# Patient Record
Sex: FEMALE | Race: WHITE | NOT HISPANIC OR LATINO | Employment: OTHER | URBAN - METROPOLITAN AREA
[De-identification: names, ages, dates, MRNs, and addresses within clinical notes are randomized per-mention and may not be internally consistent; named-entity substitution may affect disease eponyms.]

---

## 2023-03-15 LAB
ALANINE AMINOTRANSFERASE (SGPT) (U/L) IN SER/PLAS: 44 U/L (ref 7–45)
CHOLESTEROL (MG/DL) IN SER/PLAS: 152 MG/DL (ref 0–199)
CHOLESTEROL IN HDL (MG/DL) IN SER/PLAS: 74.4 MG/DL
CHOLESTEROL/HDL RATIO: 2
LDL: 60 MG/DL (ref 0–99)
TRIGLYCERIDE (MG/DL) IN SER/PLAS: 86 MG/DL (ref 0–149)
VLDL: 17 MG/DL (ref 0–40)

## 2023-05-10 LAB
ANION GAP IN SER/PLAS: 12 MMOL/L (ref 10–20)
CALCIUM (MG/DL) IN SER/PLAS: 10.1 MG/DL (ref 8.6–10.6)
CARBON DIOXIDE, TOTAL (MMOL/L) IN SER/PLAS: 30 MMOL/L (ref 21–32)
CHLORIDE (MMOL/L) IN SER/PLAS: 102 MMOL/L (ref 98–107)
CHOLESTEROL (MG/DL) IN SER/PLAS: 151 MG/DL (ref 0–199)
CHOLESTEROL IN HDL (MG/DL) IN SER/PLAS: 81.4 MG/DL
CHOLESTEROL/HDL RATIO: 1.9
CREATININE (MG/DL) IN SER/PLAS: 0.59 MG/DL (ref 0.5–1.05)
GFR FEMALE: >90 ML/MIN/1.73M2
GLUCOSE (MG/DL) IN SER/PLAS: 95 MG/DL (ref 74–99)
LDL: 55 MG/DL (ref 0–99)
POTASSIUM (MMOL/L) IN SER/PLAS: 3.9 MMOL/L (ref 3.5–5.3)
SODIUM (MMOL/L) IN SER/PLAS: 140 MMOL/L (ref 136–145)
TRIGLYCERIDE (MG/DL) IN SER/PLAS: 73 MG/DL (ref 0–149)
UREA NITROGEN (MG/DL) IN SER/PLAS: 18 MG/DL (ref 6–23)
VLDL: 15 MG/DL (ref 0–40)

## 2023-11-01 ENCOUNTER — PATIENT MESSAGE (OUTPATIENT)
Dept: PRIMARY CARE | Facility: CLINIC | Age: 68
End: 2023-11-01
Payer: MEDICARE

## 2023-11-01 DIAGNOSIS — E55.9 VITAMIN D DEFICIENCY: ICD-10-CM

## 2023-11-01 DIAGNOSIS — E78.5 HYPERLIPIDEMIA, UNSPECIFIED HYPERLIPIDEMIA TYPE: ICD-10-CM

## 2023-11-01 DIAGNOSIS — I10 HYPERTENSION, UNSPECIFIED TYPE: Primary | ICD-10-CM

## 2023-11-07 ENCOUNTER — LAB (OUTPATIENT)
Dept: LAB | Facility: LAB | Age: 68
End: 2023-11-07
Payer: MEDICARE

## 2023-11-07 DIAGNOSIS — E55.9 VITAMIN D DEFICIENCY: ICD-10-CM

## 2023-11-07 DIAGNOSIS — E78.5 HYPERLIPIDEMIA, UNSPECIFIED HYPERLIPIDEMIA TYPE: ICD-10-CM

## 2023-11-07 DIAGNOSIS — I10 HYPERTENSION, UNSPECIFIED TYPE: ICD-10-CM

## 2023-11-07 LAB
25(OH)D3 SERPL-MCNC: 66 NG/ML (ref 30–100)
ALBUMIN SERPL BCP-MCNC: 4.5 G/DL (ref 3.4–5)
ALP SERPL-CCNC: 60 U/L (ref 33–136)
ALT SERPL W P-5'-P-CCNC: 29 U/L (ref 7–45)
ANION GAP SERPL CALC-SCNC: 14 MMOL/L (ref 10–20)
AST SERPL W P-5'-P-CCNC: 21 U/L (ref 9–39)
BILIRUB SERPL-MCNC: 0.6 MG/DL (ref 0–1.2)
BUN SERPL-MCNC: 21 MG/DL (ref 6–23)
CALCIUM SERPL-MCNC: 9.7 MG/DL (ref 8.6–10.6)
CHLORIDE SERPL-SCNC: 102 MMOL/L (ref 98–107)
CO2 SERPL-SCNC: 28 MMOL/L (ref 21–32)
CREAT SERPL-MCNC: 0.6 MG/DL (ref 0.5–1.05)
GFR SERPL CREATININE-BSD FRML MDRD: >90 ML/MIN/1.73M*2
GLUCOSE SERPL-MCNC: 96 MG/DL (ref 74–99)
LDLC SERPL DIRECT ASSAY-MCNC: 50 MG/DL (ref 0–129)
POTASSIUM SERPL-SCNC: 3.6 MMOL/L (ref 3.5–5.3)
PROT SERPL-MCNC: 7 G/DL (ref 6.4–8.2)
SODIUM SERPL-SCNC: 140 MMOL/L (ref 136–145)
TSH SERPL-ACNC: 2.99 MIU/L (ref 0.44–3.98)

## 2023-11-07 PROCEDURE — 36415 COLL VENOUS BLD VENIPUNCTURE: CPT

## 2023-11-07 PROCEDURE — 83721 ASSAY OF BLOOD LIPOPROTEIN: CPT

## 2023-11-07 PROCEDURE — 82306 VITAMIN D 25 HYDROXY: CPT

## 2023-11-07 PROCEDURE — 80053 COMPREHEN METABOLIC PANEL: CPT

## 2023-11-07 PROCEDURE — 84443 ASSAY THYROID STIM HORMONE: CPT

## 2023-11-10 PROBLEM — M41.9 SCOLIOSIS: Status: ACTIVE | Noted: 2023-11-10

## 2023-11-10 PROBLEM — R79.89 ABNORMAL THYROID BLOOD TEST: Status: ACTIVE | Noted: 2023-11-10

## 2023-11-10 PROBLEM — M47.816 LUMBAR SPONDYLOSIS: Status: ACTIVE | Noted: 2023-11-10

## 2023-11-10 PROBLEM — R92.30 DENSE BREAST TISSUE: Status: ACTIVE | Noted: 2023-11-10

## 2023-11-10 PROBLEM — C50.919 ADENOCARCINOMA OF BREAST (MULTI): Status: ACTIVE | Noted: 2023-11-10

## 2023-11-10 PROBLEM — I25.10 CORONARY ARTERIOSCLEROSIS: Status: ACTIVE | Noted: 2023-11-10

## 2023-11-10 PROBLEM — D18.03 LIVER HEMANGIOMA: Status: ACTIVE | Noted: 2023-11-10

## 2023-11-10 PROBLEM — D72.819 DECREASED WHITE BLOOD CELL COUNT, UNSPECIFIED: Status: ACTIVE | Noted: 2023-11-10

## 2023-11-10 PROBLEM — I10 HYPERTENSION: Status: ACTIVE | Noted: 2023-11-10

## 2023-11-10 PROBLEM — E78.00 ELEVATED LDL CHOLESTEROL LEVEL: Status: ACTIVE | Noted: 2023-11-10

## 2023-11-10 PROBLEM — R93.89 ABNORMAL FINDING ON CT SCAN: Status: ACTIVE | Noted: 2023-11-10

## 2023-11-10 PROBLEM — R00.2 PALPITATIONS: Status: ACTIVE | Noted: 2023-11-10

## 2023-11-10 PROBLEM — R82.90 ABNORMAL URINE FINDINGS: Status: ACTIVE | Noted: 2023-11-10

## 2023-11-10 PROBLEM — R22.31 NODULE OF SKIN OF RIGHT HAND: Status: ACTIVE | Noted: 2023-11-10

## 2023-11-10 PROBLEM — M85.80 OSTEOPENIA: Status: ACTIVE | Noted: 2023-11-10

## 2023-11-10 PROBLEM — K62.5 RECTAL/ANAL HEMORRHAGE: Status: ACTIVE | Noted: 2023-11-10

## 2023-11-10 PROBLEM — S52.501A DISTAL RADIUS FRACTURE, RIGHT: Status: ACTIVE | Noted: 2023-11-10

## 2023-11-10 PROBLEM — G47.9 SLEEP DISORDER: Status: ACTIVE | Noted: 2023-11-10

## 2023-11-10 RX ORDER — ATORVASTATIN CALCIUM 80 MG/1
80 TABLET, FILM COATED ORAL NIGHTLY
COMMUNITY
Start: 2023-04-21 | End: 2023-11-13 | Stop reason: SDUPTHER

## 2023-11-10 RX ORDER — METOPROLOL TARTRATE 25 MG/1
25 TABLET, FILM COATED ORAL 2 TIMES DAILY
COMMUNITY
Start: 2023-08-31 | End: 2024-05-28

## 2023-11-10 RX ORDER — TRAMADOL HYDROCHLORIDE 50 MG/1
50 TABLET ORAL
COMMUNITY
Start: 2021-07-02 | End: 2023-11-13 | Stop reason: ALTCHOICE

## 2023-11-10 RX ORDER — CALCIUM CARBONATE/VITAMIN D3 600MG-5MCG
1 TABLET ORAL DAILY
COMMUNITY

## 2023-11-10 RX ORDER — ATORVASTATIN CALCIUM 40 MG/1
TABLET, FILM COATED ORAL
COMMUNITY
Start: 2023-10-08 | End: 2024-05-31 | Stop reason: SDUPTHER

## 2023-11-10 RX ORDER — AMLODIPINE BESYLATE 2.5 MG/1
1 TABLET ORAL DAILY
COMMUNITY
Start: 2020-08-03 | End: 2023-11-13 | Stop reason: SDUPTHER

## 2023-11-10 RX ORDER — OMEPRAZOLE 40 MG/1
40 CAPSULE, DELAYED RELEASE ORAL DAILY
COMMUNITY
Start: 2023-04-07 | End: 2023-11-13 | Stop reason: ALTCHOICE

## 2023-11-10 RX ORDER — METOPROLOL TARTRATE 50 MG/1
50 TABLET ORAL 2 TIMES DAILY
COMMUNITY
Start: 2023-02-13 | End: 2023-11-13 | Stop reason: SDUPTHER

## 2023-11-10 RX ORDER — ASPIRIN 81 MG/1
81 TABLET ORAL DAILY
COMMUNITY

## 2023-11-10 RX ORDER — HYDROCHLOROTHIAZIDE 25 MG/1
25 TABLET ORAL DAILY
COMMUNITY
End: 2024-03-13

## 2023-11-10 RX ORDER — CHOLECALCIFEROL (VITAMIN D3) 50 MCG
2 TABLET ORAL DAILY
COMMUNITY

## 2023-11-10 RX ORDER — ACETAMINOPHEN 325 MG/1
650 TABLET ORAL AS NEEDED
COMMUNITY

## 2023-11-10 RX ORDER — AMLODIPINE BESYLATE 5 MG/1
1 TABLET ORAL DAILY
COMMUNITY
End: 2024-05-09

## 2023-11-13 ENCOUNTER — OFFICE VISIT (OUTPATIENT)
Dept: PRIMARY CARE | Facility: CLINIC | Age: 68
End: 2023-11-13
Payer: MEDICARE

## 2023-11-13 VITALS
WEIGHT: 105.6 LBS | DIASTOLIC BLOOD PRESSURE: 74 MMHG | SYSTOLIC BLOOD PRESSURE: 119 MMHG | HEART RATE: 63 BPM | HEIGHT: 57 IN | BODY MASS INDEX: 22.78 KG/M2

## 2023-11-13 DIAGNOSIS — Z00.00 ROUTINE GENERAL MEDICAL EXAMINATION AT HEALTH CARE FACILITY: Primary | ICD-10-CM

## 2023-11-13 DIAGNOSIS — Z78.0 ASYMPTOMATIC MENOPAUSAL STATE: ICD-10-CM

## 2023-11-13 DIAGNOSIS — M72.2 PLANTAR FASCIITIS: ICD-10-CM

## 2023-11-13 DIAGNOSIS — I10 HYPERTENSION, UNSPECIFIED TYPE: ICD-10-CM

## 2023-11-13 DIAGNOSIS — Z00.00 MEDICARE ANNUAL WELLNESS VISIT, SUBSEQUENT: ICD-10-CM

## 2023-11-13 DIAGNOSIS — E78.5 HYPERLIPIDEMIA, UNSPECIFIED HYPERLIPIDEMIA TYPE: ICD-10-CM

## 2023-11-13 PROCEDURE — 99214 OFFICE O/P EST MOD 30 MIN: CPT | Performed by: INTERNAL MEDICINE

## 2023-11-13 PROCEDURE — 1036F TOBACCO NON-USER: CPT | Performed by: INTERNAL MEDICINE

## 2023-11-13 PROCEDURE — 1170F FXNL STATUS ASSESSED: CPT | Performed by: INTERNAL MEDICINE

## 2023-11-13 PROCEDURE — 1160F RVW MEDS BY RX/DR IN RCRD: CPT | Performed by: INTERNAL MEDICINE

## 2023-11-13 PROCEDURE — 1159F MED LIST DOCD IN RCRD: CPT | Performed by: INTERNAL MEDICINE

## 2023-11-13 PROCEDURE — 3078F DIAST BP <80 MM HG: CPT | Performed by: INTERNAL MEDICINE

## 2023-11-13 PROCEDURE — G0439 PPPS, SUBSEQ VISIT: HCPCS | Performed by: INTERNAL MEDICINE

## 2023-11-13 PROCEDURE — 1126F AMNT PAIN NOTED NONE PRSNT: CPT | Performed by: INTERNAL MEDICINE

## 2023-11-13 PROCEDURE — 3074F SYST BP LT 130 MM HG: CPT | Performed by: INTERNAL MEDICINE

## 2023-11-13 ASSESSMENT — PATIENT HEALTH QUESTIONNAIRE - PHQ9
SUM OF ALL RESPONSES TO PHQ9 QUESTIONS 1 AND 2: 0
2. FEELING DOWN, DEPRESSED OR HOPELESS: NOT AT ALL
1. LITTLE INTEREST OR PLEASURE IN DOING THINGS: NOT AT ALL
1. LITTLE INTEREST OR PLEASURE IN DOING THINGS: NOT AT ALL
2. FEELING DOWN, DEPRESSED OR HOPELESS: NOT AT ALL
SUM OF ALL RESPONSES TO PHQ9 QUESTIONS 1 AND 2: 0

## 2023-11-13 ASSESSMENT — ACTIVITIES OF DAILY LIVING (ADL)
TAKING_MEDICATION: INDEPENDENT
DRESSING: INDEPENDENT
DRESSING: INDEPENDENT
BATHING: INDEPENDENT
BATHING: INDEPENDENT
DOING_HOUSEWORK: INDEPENDENT
MANAGING_FINANCES: INDEPENDENT
GROCERY_SHOPPING: INDEPENDENT

## 2023-11-13 ASSESSMENT — ENCOUNTER SYMPTOMS
LOSS OF SENSATION IN FEET: 0
OCCASIONAL FEELINGS OF UNSTEADINESS: 0
DEPRESSION: 0

## 2023-11-13 NOTE — PROGRESS NOTES
Subjective   Patient ID: Anna Smith is a 68 y.o. female who presents for Medicare Annual Wellness Visit Subsequent.    HPI  Patient comes in for a physical exam last one done over a year ago , doing well over-all with no particular complaints. Also is in for laboratory review and health maintenance update.  Updating family history as well.  Interval event - past medical history, surgical, social, and family history reviewed and updated.  Interval care -  Patient is    up to date with dental care.  Patient does     receive routine vision care.    Review of Systems  General: Denies fever, chills, night sweats, changes in appetite or weight  ENT: Negative for ear pain, hearing loss, headache, difficulty swallowing, up to date with dental checks   Eyes: Negative for recent visual changes, up to date with eye exams  Dermatologic: Negative for new skin conditions, rash  Respiratory: Negative for paroxysmal nocturnal dyspnea, wheezing,shortness of breath, cough  Cardiovascular: Negative for chest pain, palpitations, or leg swelling  Gastrointestinal: Negative for nausea/vomiting, abdominal pain, changes in bowel habits  Genitourinary: Negative for dysuria, urgency, frequency  URINARY INCONTINENCE   Neurological: Negative for headaches, tremors, dizziness, memory loss, confusion, weakness, paresthesias  Psychiatric: Negative for sleep problems, anxiety, depression, conditions are stable  Endocrine: Negative for heat or cold intolerance, polyuria, polydipsia  Other:All systems have been reviewed and are negative except as previously noted.    Previous history  Past Medical History:   Diagnosis Date   • Asymptomatic menopausal state 04/15/2021    Asymptomatic menopausal state   • Encounter for general adult medical examination without abnormal findings 10/28/2022    Encounter for Medicare annual wellness exam   • Hemangioma of intra-abdominal structures 05/24/2017    Liver hemangioma   • Other chest pain 01/04/2023     Chest discomfort   • Other specified abnormalities of plasma proteins 2023    Elevated troponin   • Personal history of other benign neoplasm 2021    History of other benign neoplasm   • Sleep disorder, unspecified 2022    Sleep disorder   • Unspecified injury of unspecified ankle, initial encounter     Ankle injury     Past Surgical History:   Procedure Laterality Date   • BREAST LUMPECTOMY  2019    Breast Surgery Lumpectomy   • CT ANGIO CORONARY ART WITH HEARTFLOW IF SCORE >30%  2022    CT HEART CORONARY ANGIOGRAM 2022 DOCTOR OFFICE [facility]GAC[facility]     Social History     Tobacco Use   • Smoking status: Former     Types: Cigarettes     Quit date:      Years since quittin.8   • Smokeless tobacco: Never   Vaping Use   • Vaping Use: Never used   Substance Use Topics   • Alcohol use: Yes     Alcohol/week: 7.0 standard drinks of alcohol     Types: 7 Glasses of wine per week     Comment: 7 glasses/week   • Drug use: Never     Family History   Problem Relation Name Age of Onset   • Other (Parkinsons) Mother  70 - 79   • Other (Lewy Body) Mother     • Heart attack Father     • Coronary artery disease Sister     • Breast cancer Sister  45   • Coronary artery disease Brother     • Hyperlipidemia Brother     • Myasthenia gravis Brother     • Congenital heart disease Other Family Hx    • No Known Problems Sibling       No Known Allergies  Current Outpatient Medications   Medication Instructions   • acetaminophen (TYLENOL) 650 mg, oral, As needed   • amLODIPine (Norvasc) 2.5 mg tablet 1 tablet, oral, Daily   • amLODIPine (Norvasc) 5 mg tablet 1 tablet, oral, Daily   • aspirin 81 mg, oral, Daily   • atorvastatin (Lipitor) 40 mg tablet    • atorvastatin (LIPITOR) 80 mg, oral, Nightly   • calcium carbonate (CALCIUM 600 ORAL) 2 tablets, oral, Daily   • calcium carbonate-vitamin D3 600 mg-5 mcg (200 unit) tablet 1 tablet, oral, Daily   • cholecalciferol (Vitamin D-3) 50 MCG (2000 UT) tablet 2  tablets, oral, Daily   • ferrous sulfate (IRON ORAL) 1 tablet, oral, 3 times weekly   • ferrous sulfate (IRON ORAL) 1 tablet, oral, 4 times weekly   • hydroCHLOROthiazide (HYDRODIURIL) 25 mg, oral, Daily   • metoprolol tartrate (LOPRESSOR) 25 mg, oral, 2 times daily   • metoprolol tartrate (LOPRESSOR) 50 mg, oral, 2 times daily   • MULTIVITAMIN ORAL oral   • omeprazole (PRILOSEC) 40 mg, oral, Daily   • traMADol (ULTRAM) 50 mg, oral, Every 6-8 hours as needed for pain       Objective       Physical Exam  Vital Signs: as recorded above  General: Well groomed, well nourished   Orientation:  Alert , oriented to time, place , and person   Mood and Affect:  Cooperative , no apparent distress normal affect  Skin: Good color, good turgor  Eyes: Extra ocular muscle movements intact, anicteric sclerae  Neck: Supple, full range of movement  Chest: Normal breath sounds, normal chest wall exam, symmetric, good air entry, clear to auscultation  Heart: Regular rate and rhythm, without murmur, gallop, or rubs  Abdomen soft nontender no masses felt no hepatosplenomegaly, no rebound or guarding  BACK:  no CTLS spine tenderness, no flank tenderness  Extremities: full range of movement  bilateral UE and bilateral LE,  no lower extremity edema  Neurological: Alert, oriented, cranial nerves II-XII intact except for visual acuity  Sensation:  Intact   Gait: normal steady      Assessment/Plan   Anna Smith is a 68 y.o. female who presents for the concerns below:    Problem List Items Addressed This Visit    None    Annual Wellness Visit  the risks and benefits of influenza vaccination were discussed with the patient, influenza vaccine is up to date this year  the risks and benefits of Prevnar 20 (or previous Prevnar 13)   vaccination were discussed with the patient, Prevnar 20 vaccine is      up to date  the risks and benefits of pneumonia vaccination were discussed with the patient, pneumonia vaccine is  up to date   the risks and  benefits of  Shingrix  vaccination were discussed with the patient, Shingrix  vaccine is       up to date   the risks and benefits of tetanus vaccination were discussed with the patient, tetanus vaccine is      up to date   Cardiovascular screening and counseling the risk and benefits of screening were discussed counseling on maintaining a healthy diet and due for lipid panel  Breast cancer screening and counseling , the risks and benefits of screening were discussed with the patient, and screening is current     order is in  Cervical cancer screening and counseling , the risks and benefits of screening were discussed with the patient, and screening is current seeing gynecology  Osteoporosis screening and counseling was given on obtaining adequate amounts of calcium and vitamin D on a daily basis and weight bearing exercise such as walking  , the risks and benefits of screening were discussed with the patient, and screening is current    Colorectal cancer screening and counseling; the risks and benefits of screening were discussed with the patient, colon cancer screening is       up to date  Abdominal aortic aneurysm screening and counseling,  the risks and benefits of screening were discussed with the patient, screening is not indicated   Visual acuity / Glaucoma screening and counseling , the risks and benefits of vision screening were discussed with the patient, screening is current   HIV screening and Counseling, the risks and benefits of screening were discussed with the patient, screening is not indicated   Advance directive planning : needs to be updated information forms given to patient .  complete and up  to date    Other Preventive Counseling Provided :  fall risk reduction exercise , physical activity .  Patient Discussion:  plan discussed with the patient       HYPERTENSION PLAN:  , taking blood pressure medication  Follow low salt diet, exercise as discussed, weight control. Continue current  medications    HYPERCHOLESTEROLEMIA PLAN: stable  continue current medications  Follow low cholesterol diet, encouraged high omega 3 fatty acid intake in diet, exercise, weight control. . Will Obtain AST/ALT, fasting lipid profile, creatine kinase  on statin if not done within past 3-6 months . Coenzyme Q10 200 mg a day if able to help increase HDL.    Will get covid booster    Discussed with:   Return in :    Portions of this note were generated using digital voice recognition software, and may contain grammatical errors       Tram Connelly MD  11/13/23  9:44 AM

## 2024-03-13 DIAGNOSIS — I25.10 ATHEROSCLEROTIC HEART DISEASE OF NATIVE CORONARY ARTERY WITHOUT ANGINA PECTORIS: Primary | ICD-10-CM

## 2024-03-13 RX ORDER — HYDROCHLOROTHIAZIDE 25 MG/1
25 TABLET ORAL DAILY
Qty: 90 TABLET | Refills: 3 | Status: SHIPPED | OUTPATIENT
Start: 2024-03-13

## 2024-05-09 DIAGNOSIS — I10 HYPERTENSION, UNSPECIFIED TYPE: Primary | ICD-10-CM

## 2024-05-09 RX ORDER — AMLODIPINE BESYLATE 5 MG/1
5 TABLET ORAL DAILY
Qty: 90 TABLET | Refills: 2 | Status: SHIPPED | OUTPATIENT
Start: 2024-05-09

## 2024-05-22 ENCOUNTER — OFFICE VISIT (OUTPATIENT)
Dept: CARDIOLOGY | Facility: HOSPITAL | Age: 69
End: 2024-05-22
Payer: MEDICARE

## 2024-05-22 VITALS
SYSTOLIC BLOOD PRESSURE: 116 MMHG | BODY MASS INDEX: 22.04 KG/M2 | HEIGHT: 58 IN | HEART RATE: 53 BPM | DIASTOLIC BLOOD PRESSURE: 75 MMHG | WEIGHT: 105 LBS

## 2024-05-22 DIAGNOSIS — I25.10 CORONARY ARTERIOSCLEROSIS: Primary | ICD-10-CM

## 2024-05-22 DIAGNOSIS — I10 PRIMARY HYPERTENSION: ICD-10-CM

## 2024-05-22 DIAGNOSIS — E78.00 ELEVATED LDL CHOLESTEROL LEVEL: ICD-10-CM

## 2024-05-22 LAB
ATRIAL RATE: 53 BPM
P AXIS: 69 DEGREES
P OFFSET: 189 MS
P ONSET: 139 MS
PR INTERVAL: 164 MS
Q ONSET: 221 MS
QRS COUNT: 9 BEATS
QRS DURATION: 92 MS
QT INTERVAL: 446 MS
QTC CALCULATION(BAZETT): 418 MS
QTC FREDERICIA: 428 MS
R AXIS: 16 DEGREES
T AXIS: 49 DEGREES
T OFFSET: 444 MS
VENTRICULAR RATE: 53 BPM

## 2024-05-22 PROCEDURE — 93005 ELECTROCARDIOGRAM TRACING: CPT | Performed by: INTERNAL MEDICINE

## 2024-05-22 PROCEDURE — 99214 OFFICE O/P EST MOD 30 MIN: CPT | Performed by: INTERNAL MEDICINE

## 2024-05-22 PROCEDURE — 1157F ADVNC CARE PLAN IN RCRD: CPT | Performed by: INTERNAL MEDICINE

## 2024-05-22 PROCEDURE — G2211 COMPLEX E/M VISIT ADD ON: HCPCS | Performed by: INTERNAL MEDICINE

## 2024-05-22 PROCEDURE — 1036F TOBACCO NON-USER: CPT | Performed by: INTERNAL MEDICINE

## 2024-05-22 PROCEDURE — 3078F DIAST BP <80 MM HG: CPT | Performed by: INTERNAL MEDICINE

## 2024-05-22 PROCEDURE — 3074F SYST BP LT 130 MM HG: CPT | Performed by: INTERNAL MEDICINE

## 2024-05-22 PROCEDURE — 1159F MED LIST DOCD IN RCRD: CPT | Performed by: INTERNAL MEDICINE

## 2024-05-22 PROCEDURE — 1160F RVW MEDS BY RX/DR IN RCRD: CPT | Performed by: INTERNAL MEDICINE

## 2024-05-22 ASSESSMENT — ENCOUNTER SYMPTOMS
OCCASIONAL FEELINGS OF UNSTEADINESS: 0
DEPRESSION: 0
LOSS OF SENSATION IN FEET: 0

## 2024-05-22 NOTE — PROGRESS NOTES
Primary Care Physician: Tram Connelly MD  Date of Visit: 05/22/2024  3:00 PM EDT  Location of visit: [facility]     Chief Complaint:   Chief Complaint   Patient presents with    Follow-up        HPI / Summary:   Anna Smith is a 68 y.o. female presents for followup.  She has no cardiac complaints.  She is physically active and hikes 5 miles 5 days a week without chest pain or shortness of breath.  The patient denies chest pain, shortness of breath, palpitations, lightheadedness, syncope, orthopnea, paroxysmal nocturnal dyspnea, lower extremity edema, or bleeding problems.    She developed myalgias at 80 mg of atorvastatin.  She has been tolerating 40 mg for more than the last year.      Past Medical History:   Diagnosis Date    Asymptomatic menopausal state 04/15/2021    Asymptomatic menopausal state    Encounter for general adult medical examination without abnormal findings 10/28/2022    Encounter for Medicare annual wellness exam    Hemangioma of intra-abdominal structures 05/24/2017    Liver hemangioma    Other chest pain 01/04/2023    Chest discomfort    Other specified abnormalities of plasma proteins 01/04/2023    Elevated troponin    Personal history of other benign neoplasm 07/13/2021    History of other benign neoplasm    Sleep disorder, unspecified 04/05/2022    Sleep disorder    Unspecified injury of unspecified ankle, initial encounter     Ankle injury        Past Surgical History:   Procedure Laterality Date    BREAST LUMPECTOMY  02/11/2019    Breast Surgery Lumpectomy    CT ANGIO CORONARY ART WITH HEARTFLOW IF SCORE >30%  12/21/2022    CT HEART CORONARY ANGIOGRAM 12/21/2022 DOCTOR OFFICE [facility]GAC[facility]          Social History:   reports that she quit smoking about 51 years ago. Her smoking use included cigarettes. She has never used smokeless tobacco. She reports current alcohol use of about 7.0 standard drinks of alcohol per week. She reports that she does not use drugs.     Family  "History:  family history includes Breast cancer (age of onset: 45) in her sister; Congenital heart disease in an other family member; Coronary artery disease in her brother, brother, and sister; Heart attack in her father; Hyperlipidemia in her brother; Lewy Body in her mother; Myasthenia gravis in her brother; No Known Problems in her brother and sibling; Parkinsons (age of onset: 70 - 79) in her mother.      Allergies:  No Known Allergies    Outpatient Medications:  Current Outpatient Medications   Medication Instructions    acetaminophen (TYLENOL) 650 mg, oral, As needed    amLODIPine (NORVASC) 5 mg, oral, Daily, for blood pressure    aspirin 81 mg, oral, Daily    atorvastatin (Lipitor) 40 mg tablet     calcium carbonate-vitamin D3 600 mg-5 mcg (200 unit) tablet 1 tablet, oral, Daily    cholecalciferol (Vitamin D-3) 50 MCG (2000 UT) tablet 2 tablets, oral, Daily    hydroCHLOROthiazide (HYDRODIURIL) 25 mg, oral, Daily    metoprolol tartrate (LOPRESSOR) 25 mg, oral, 2 times daily    MULTIVITAMIN ORAL oral       Physical Exam:  Vitals:    05/22/24 1457   BP: 116/75   BP Location: Left arm   Patient Position: Sitting   BP Cuff Size: Adult   Pulse: 53   Weight: 47.6 kg (105 lb)   Height: 1.473 m (4' 10\")     Wt Readings from Last 5 Encounters:   05/22/24 47.6 kg (105 lb)   11/13/23 47.9 kg (105 lb 9.6 oz)   08/24/23 48.5 kg (107 lb)   05/19/23 46.7 kg (103 lb)   02/22/23 45.8 kg (101 lb 0.6 oz)     Body mass index is 21.95 kg/m².   General: Well-developed well-nourished in no acute distress  HEENT: Normocephalic atraumatic  Neck: Supple, JVP is normal negative hepatojugular reflux 2+ carotid pulses without bruit  Pulmonary: Normal respiratory effort, clear to auscultation  Cardiovascular: No right ventricular heave, normal S1 and S2, no murmurs rubs or gallops  Abdomen: Soft nontender nondistended  Extremities: Warm without edema 2+ radial pulses bilaterally 2+ posterior tibial pulses bilaterally  Neurologic: Alert " "and oriented x3  Psychiatric: Normal mood and affect     Last Labs:  CMP:  Recent Labs     11/07/23  0949 05/10/23  1101 01/26/23  1310    140 143   K 3.6 3.9 3.8    102 104   CO2 28 30 30   ANIONGAP 14 12 13   BUN 21 18 19   CREATININE 0.60 0.59 0.65   EGFR >90  --   --    GLUCOSE 96 95 88     Recent Labs     11/07/23  0949 03/15/23  1159 01/26/23  1310 12/20/22  2231   ALBUMIN 4.5  --  4.5 4.6   ALKPHOS 60  --  63 51   ALT 29 44 34 17   AST 21  --  25 19   BILITOT 0.6  --  0.7 0.3     CBC:  Recent Labs     12/22/22  0612 12/20/22 2231 12/14/22  1325   WBC 6.1 5.2 5.6   HGB 12.6 13.4 13.5   HCT 39.1 39.5 41.7    362 342   MCV 92 89 93     COAG: No results for input(s): \"INR\", \"DDIMERVTE\" in the last 41344 hours.  HEME/ENDO:  Recent Labs     11/07/23  0949 12/20/22  2231 10/19/22  1147 09/24/21  0942 08/25/20  1028 09/30/19  1009   FERRITIN  --   --   --   --   --  138   IRONSAT  --   --   --   --   --  22*   TSH 2.99  --  3.31 3.72   < > 2.84   HGBA1C  --  5.5  --   --   --   --     < > = values in this interval not displayed.      CARDIAC:   Recent Labs     12/20/22 2231 12/20/22  1512 12/14/22  1525   TROPHS 29* 289* 29*     Recent Labs     05/10/23  1101 03/15/23  1159 01/26/23  1310   CHOL 151 152 127   LDLF 55 60 45   HDL 81.4 74.4 71.5   TRIG 73 86 52       Last Cardiology Tests:  ECG:  Electrocardiogram performed today that I reviewed shows sinus bradycardia and is otherwise normal.    Echo:  Echocardiogram December 21, 2022  CONCLUSIONS:   1. Left ventricular systolic function is normal with a 65-70% estimated ejection fraction.   2. Spectral Doppler shows an impaired relaxation pattern of left ventricular diastolic filling.       Cath:      Stress Test:  Stress Results:  No results found for this or any previous visit from the past 365 days.         Cardiac Imaging:  CT coronary angiogram with heart flow December 22, 2022  IMPRESSION:  1. Left dominant coronary artery system.  2. Mild " atherosclerosis in the proximal LAD resulting in less than  25% luminal stenosis. The remainder of the coronary artery system  appears free of significant atherosclerosis    CT cardiac scoring April 4, 2017  FINDINGS:  The score and distribution of calcium in the coronary arteries is as  follows:     LM: 0.  LAD: 6.4.  LCx: 0.  RCA: 0.     Total: 6.4.     The visualized segments of the lungs show mild bibasilar infiltrates  and/or atelectasis.     The visualized mid/lower ascending thoracic aorta measures 3.4 cm in  diameter. The heart is borderline enlarged. Trace pericardial  effusion.    Assessment/Plan   Diagnoses and all orders for this visit:  Coronary arteriosclerosis  Primary hypertension  Elevated LDL cholesterol level    In summary Ms. Smith is a pleasant 68 year-old white female with a past medical history significant for coronary atherosclerosis with a CT calcium score of 6.4 in 2017 with nonobstructive mild CAD on CT angiography with preserved LV function, hypertension, hyperlipidemia with intolerance to 80 mg of atorvastatin, and remote breast cancer with a history of Adriamycin use and radiation.  She is asymptomatic from a cardiac perspective.  Her blood pressure and lipids are at goal.  I did review the risks and benefits of aspirin.  Given she had an elevated troponin of unclear etiology in 2022 we elected to keep her on aspirin.  She should continue her current cardiovascular medications.  We will see her back in follow-up in 1 year.      Orders:  Orders Placed This Encounter   Procedures    Lipid Panel    Comprehensive Metabolic Panel    CBC    ECG 12 lead (Clinic Performed)      Followup Appts:  Future Appointments   Date Time Provider Department Center   5/31/2024 10:45 AM Tram Connelly MD [id number] [facility]   6/3/2024 10:45 AM Nai Elder MD [id number] [facility]   6/10/2024  2:30 PM [facility] [id number] DEXA [id number] [facility]            ____________________________________________________________  Juan Castro MD  [facility]

## 2024-05-25 DIAGNOSIS — I10 ESSENTIAL (PRIMARY) HYPERTENSION: ICD-10-CM

## 2024-05-28 ENCOUNTER — LAB (OUTPATIENT)
Dept: LAB | Facility: LAB | Age: 69
End: 2024-05-28
Payer: MEDICARE

## 2024-05-28 DIAGNOSIS — E78.00 ELEVATED LDL CHOLESTEROL LEVEL: ICD-10-CM

## 2024-05-28 DIAGNOSIS — I25.10 CORONARY ARTERIOSCLEROSIS: ICD-10-CM

## 2024-05-28 LAB
ALBUMIN SERPL BCP-MCNC: 4.4 G/DL (ref 3.4–5)
ALP SERPL-CCNC: 63 U/L (ref 33–136)
ALT SERPL W P-5'-P-CCNC: 29 U/L (ref 7–45)
ANION GAP SERPL CALC-SCNC: 12 MMOL/L (ref 10–20)
AST SERPL W P-5'-P-CCNC: 22 U/L (ref 9–39)
BILIRUB SERPL-MCNC: 0.4 MG/DL (ref 0–1.2)
BUN SERPL-MCNC: 14 MG/DL (ref 6–23)
CALCIUM SERPL-MCNC: 9.9 MG/DL (ref 8.6–10.6)
CHLORIDE SERPL-SCNC: 102 MMOL/L (ref 98–107)
CHOLEST SERPL-MCNC: 152 MG/DL (ref 0–199)
CHOLESTEROL/HDL RATIO: 1.9
CO2 SERPL-SCNC: 29 MMOL/L (ref 21–32)
CREAT SERPL-MCNC: 0.57 MG/DL (ref 0.5–1.05)
EGFRCR SERPLBLD CKD-EPI 2021: >90 ML/MIN/1.73M*2
ERYTHROCYTE [DISTWIDTH] IN BLOOD BY AUTOMATED COUNT: 14.6 % (ref 11.5–14.5)
GLUCOSE SERPL-MCNC: 96 MG/DL (ref 74–99)
HCT VFR BLD AUTO: 40.9 % (ref 36–46)
HDLC SERPL-MCNC: 78.3 MG/DL
HGB BLD-MCNC: 13.1 G/DL (ref 12–16)
LDLC SERPL CALC-MCNC: 58 MG/DL
MCH RBC QN AUTO: 30.4 PG (ref 26–34)
MCHC RBC AUTO-ENTMCNC: 32 G/DL (ref 32–36)
MCV RBC AUTO: 95 FL (ref 80–100)
NON HDL CHOLESTEROL: 74 MG/DL (ref 0–149)
NRBC BLD-RTO: 0 /100 WBCS (ref 0–0)
PLATELET # BLD AUTO: 315 X10*3/UL (ref 150–450)
POTASSIUM SERPL-SCNC: 3.8 MMOL/L (ref 3.5–5.3)
PROT SERPL-MCNC: 7 G/DL (ref 6.4–8.2)
RBC # BLD AUTO: 4.31 X10*6/UL (ref 4–5.2)
SODIUM SERPL-SCNC: 139 MMOL/L (ref 136–145)
TRIGL SERPL-MCNC: 79 MG/DL (ref 0–149)
VLDL: 16 MG/DL (ref 0–40)
WBC # BLD AUTO: 5.6 X10*3/UL (ref 4.4–11.3)

## 2024-05-28 PROCEDURE — 80061 LIPID PANEL: CPT

## 2024-05-28 PROCEDURE — 80053 COMPREHEN METABOLIC PANEL: CPT

## 2024-05-28 PROCEDURE — 85027 COMPLETE CBC AUTOMATED: CPT

## 2024-05-28 PROCEDURE — 36415 COLL VENOUS BLD VENIPUNCTURE: CPT

## 2024-05-28 RX ORDER — METOPROLOL TARTRATE 25 MG/1
25 TABLET, FILM COATED ORAL 2 TIMES DAILY
Qty: 180 TABLET | Refills: 1 | Status: SHIPPED | OUTPATIENT
Start: 2024-05-28

## 2024-05-31 ENCOUNTER — OFFICE VISIT (OUTPATIENT)
Dept: PRIMARY CARE | Facility: CLINIC | Age: 69
End: 2024-05-31
Payer: MEDICARE

## 2024-05-31 VITALS — SYSTOLIC BLOOD PRESSURE: 134 MMHG | BODY MASS INDEX: 21.53 KG/M2 | DIASTOLIC BLOOD PRESSURE: 76 MMHG | WEIGHT: 103 LBS

## 2024-05-31 DIAGNOSIS — I10 HYPERTENSION, UNSPECIFIED TYPE: ICD-10-CM

## 2024-05-31 DIAGNOSIS — E55.9 VITAMIN D DEFICIENCY: ICD-10-CM

## 2024-05-31 DIAGNOSIS — M54.9 BACK PAIN, UNSPECIFIED BACK LOCATION, UNSPECIFIED BACK PAIN LATERALITY, UNSPECIFIED CHRONICITY: ICD-10-CM

## 2024-05-31 DIAGNOSIS — E78.00 ELEVATED LDL CHOLESTEROL LEVEL: Primary | ICD-10-CM

## 2024-05-31 PROCEDURE — 99214 OFFICE O/P EST MOD 30 MIN: CPT | Performed by: INTERNAL MEDICINE

## 2024-05-31 PROCEDURE — 1158F ADVNC CARE PLAN TLK DOCD: CPT | Performed by: INTERNAL MEDICINE

## 2024-05-31 PROCEDURE — 3075F SYST BP GE 130 - 139MM HG: CPT | Performed by: INTERNAL MEDICINE

## 2024-05-31 PROCEDURE — 3078F DIAST BP <80 MM HG: CPT | Performed by: INTERNAL MEDICINE

## 2024-05-31 PROCEDURE — 1159F MED LIST DOCD IN RCRD: CPT | Performed by: INTERNAL MEDICINE

## 2024-05-31 PROCEDURE — 1123F ACP DISCUSS/DSCN MKR DOCD: CPT | Performed by: INTERNAL MEDICINE

## 2024-05-31 PROCEDURE — 1157F ADVNC CARE PLAN IN RCRD: CPT | Performed by: INTERNAL MEDICINE

## 2024-05-31 PROCEDURE — 1160F RVW MEDS BY RX/DR IN RCRD: CPT | Performed by: INTERNAL MEDICINE

## 2024-05-31 PROCEDURE — 1036F TOBACCO NON-USER: CPT | Performed by: INTERNAL MEDICINE

## 2024-05-31 RX ORDER — ATORVASTATIN CALCIUM 40 MG/1
TABLET, FILM COATED ORAL
Qty: 90 TABLET | Refills: 1 | Status: SHIPPED | OUTPATIENT
Start: 2024-05-31

## 2024-05-31 ASSESSMENT — PATIENT HEALTH QUESTIONNAIRE - PHQ9
2. FEELING DOWN, DEPRESSED OR HOPELESS: NOT AT ALL
1. LITTLE INTEREST OR PLEASURE IN DOING THINGS: NOT AT ALL
SUM OF ALL RESPONSES TO PHQ9 QUESTIONS 1 AND 2: 0

## 2024-05-31 ASSESSMENT — ENCOUNTER SYMPTOMS
DEPRESSION: 0
OCCASIONAL FEELINGS OF UNSTEADINESS: 0
LOSS OF SENSATION IN FEET: 0

## 2024-05-31 ASSESSMENT — COLUMBIA-SUICIDE SEVERITY RATING SCALE - C-SSRS
6. HAVE YOU EVER DONE ANYTHING, STARTED TO DO ANYTHING, OR PREPARED TO DO ANYTHING TO END YOUR LIFE?: NO
1. IN THE PAST MONTH, HAVE YOU WISHED YOU WERE DEAD OR WISHED YOU COULD GO TO SLEEP AND NOT WAKE UP?: NO
2. HAVE YOU ACTUALLY HAD ANY THOUGHTS OF KILLING YOURSELF?: NO

## 2024-05-31 NOTE — PROGRESS NOTES
Subjective   Patient ID: Anna Smith is a 68 y.o. female who presents for FUV.    HPI  Patient in for a visit  She had seen cardiology last week  No unsteadiness , hiking with companions and cell phone no falling no walking stick unless winter     Review of Systems  General: Denies fever, chills, night sweats,  Eyes: Negative for recent visual changes  Ears, Nose, Throat :  Negative for hearing changes, sinus discomfort  Dermatologic: Negative for new skin conditions, rash  Respiratory: Negative for wheezing, shortness of breath, cough  Cardiovascular: Negative for chest pain, palpitations, or leg swelling  Gastrointestinal: Negative for nausea/vomiting, abdominal pain, changes in bowel habits  Genitourinary Negative for Urinary Incontinence  urgency , frequency, discomfort   Musculoskeletal: see hpi  Neurological: Negative for headaches, dizziness    Previous history  Past Medical History:   Diagnosis Date    Asymptomatic menopausal state 04/15/2021    Asymptomatic menopausal state    Encounter for general adult medical examination without abnormal findings 10/28/2022    Encounter for Medicare annual wellness exam    Hemangioma of intra-abdominal structures 05/24/2017    Liver hemangioma    Other chest pain 01/04/2023    Chest discomfort    Other specified abnormalities of plasma proteins 01/04/2023    Elevated troponin    Personal history of other benign neoplasm 07/13/2021    History of other benign neoplasm    Sleep disorder, unspecified 04/05/2022    Sleep disorder    Unspecified injury of unspecified ankle, initial encounter     Ankle injury     Past Surgical History:   Procedure Laterality Date    BREAST LUMPECTOMY  02/11/2019    Breast Surgery Lumpectomy    CT ANGIO CORONARY ART WITH HEARTFLOW IF SCORE >30%  12/21/2022    CT HEART CORONARY ANGIOGRAM 12/21/2022 DOCTOR OFFICE [facility]GAC[facility]     Social History     Tobacco Use    Smoking status: Former     Current packs/day: 0.00     Types: Cigarettes     Quit date:  1973     Years since quittin.4    Smokeless tobacco: Never   Vaping Use    Vaping status: Never Used   Substance Use Topics    Alcohol use: Yes     Alcohol/week: 7.0 standard drinks of alcohol     Types: 7 Glasses of wine per week     Comment: 5-6 glasses of wine    Drug use: Never     Family History   Problem Relation Name Age of Onset    Other (Parkinsons) Mother  70 - 79    Other (Lewy Body) Mother      Heart attack Father      Coronary artery disease Sister      Breast cancer Sister  45    Coronary artery disease Brother      Hyperlipidemia Brother      Myasthenia gravis Brother      Coronary artery disease Brother      No Known Problems Brother      No Known Problems Sibling      Congenital heart disease Other Family Hx      No Known Allergies  Current Outpatient Medications   Medication Instructions    acetaminophen (TYLENOL) 650 mg, oral, As needed    amLODIPine (NORVASC) 5 mg, oral, Daily, for blood pressure    aspirin 81 mg, oral, Daily    atorvastatin (Lipitor) 40 mg tablet     calcium carbonate-vitamin D3 600 mg-5 mcg (200 unit) tablet 1 tablet, oral, Daily    cholecalciferol (Vitamin D-3) 50 MCG (2000 UT) tablet 2 tablets, oral, Daily    hydroCHLOROthiazide (HYDRODIURIL) 25 mg, oral, Daily    metoprolol tartrate (LOPRESSOR) 25 mg, oral, 2 times daily    MULTIVITAMIN ORAL oral       Objective       Physical Exam  Vital Signs: as recorded above  General: Well groomed, well nourished   Orientation:  Alert , oriented to time, place , and person   Mood and Affect:  Cooperative , no apparent distress normal affect  Skin: Good color, good turgor  Eyes: Extra ocular muscle movements intact, anicteric sclerae  Neck: Supple, full range of movement  Chest: Normal breath sounds, normal chest wall exam, symmetric, good air entry, clear to auscultation  Heart: Regular rate and rhythm, without murmur, gallop, or rubs  BACK:  no CTLS spine tenderness with thoracolumber levoscoliosis  , no flank  tenderness  Extremities: full range of movement  bilateral UE and bilateral LE,  no lower extremity edema  Neurological: Alert, oriented, cranial nerves II-XII grossly intact except for visual acuity  Sensation:  Intact   Gait: normal steady      Assessment/Plan   Anna Smith is a 68 y.o. female who presents for the concerns below:    Problem List Items Addressed This Visit    None    Back pain , hip pain - hip jutting out more no resp problems no falling, will put in consult to PT see if there are more exercises treatments to help with pain , in future if pain is not responding to usual will consider Medical spine consult vs PM&R consult  will check with chiropractor service at [facility] if they have any regimen for Thoracolumber scoliosis     HYPERTENSION PLAN:  , taking blood pressure medication  Follow low salt diet, exercise as discussed, weight control. Continue current medications    CAD / HYPERCHOLESTEROLEMIA PLAN: stable  continue current medications at 40 mg unless Dr Amanda says otherwise she had problems with the high dose 80 mg her current LDL is 58 not sure if they want it lower had her CT angio in 2022 mild atherosclerosis prox LAD and last CACS in  2017 score 6.4  continue to  Follow low cholesterol diet, encouraged high omega 3 fatty acid intake in diet, exercise, weight control. . Will Obtain AST/ALT, fasting lipid profile, creatine kinase  on statin 6 months . Coenzyme Q10 200 mg a day if able to help increase HDL.   Seeing Dr Elder next week will et her mammogram soon August when due   Scheduled for BMD , had seen Dr Maldonado , not on fosamax  if bmd shows continued need for it would have her start or see Dr Maldonado again if to see what our options are        Discussed with:   Return in : 6 months for AWV and ffup     Portions of this note were generated using digital voice recognition software, and may contain grammatical errors       Tram Connelly MD  05/31/24  11:12 AM

## 2024-05-31 NOTE — PATIENT INSTRUCTIONS
Medical Spine specialists  and PHYSICAL MEDICINE AND REHABILITATION services   I will check with our chiropractor specialists if they do interventions for curvature of the spine

## 2024-06-03 ENCOUNTER — OFFICE VISIT (OUTPATIENT)
Dept: OBSTETRICS AND GYNECOLOGY | Facility: CLINIC | Age: 69
End: 2024-06-03
Payer: MEDICARE

## 2024-06-03 VITALS
BODY MASS INDEX: 22.46 KG/M2 | WEIGHT: 107 LBS | HEIGHT: 58 IN | SYSTOLIC BLOOD PRESSURE: 127 MMHG | DIASTOLIC BLOOD PRESSURE: 77 MMHG

## 2024-06-03 DIAGNOSIS — Z78.0 MENOPAUSE: ICD-10-CM

## 2024-06-03 DIAGNOSIS — Z12.31 ENCOUNTER FOR SCREENING MAMMOGRAM FOR MALIGNANT NEOPLASM OF BREAST: ICD-10-CM

## 2024-06-03 DIAGNOSIS — Z01.419 ENCOUNTER FOR GYNECOLOGICAL EXAMINATION WITHOUT ABNORMAL FINDING: Primary | ICD-10-CM

## 2024-06-03 DIAGNOSIS — Z85.3 H/O MALIGNANT NEOPLASM OF BREAST: ICD-10-CM

## 2024-06-03 PROCEDURE — G0101 CA SCREEN;PELVIC/BREAST EXAM: HCPCS | Performed by: OBSTETRICS & GYNECOLOGY

## 2024-06-03 PROCEDURE — 1159F MED LIST DOCD IN RCRD: CPT | Performed by: OBSTETRICS & GYNECOLOGY

## 2024-06-03 PROCEDURE — 3078F DIAST BP <80 MM HG: CPT | Performed by: OBSTETRICS & GYNECOLOGY

## 2024-06-03 PROCEDURE — 3074F SYST BP LT 130 MM HG: CPT | Performed by: OBSTETRICS & GYNECOLOGY

## 2024-06-03 PROCEDURE — 1157F ADVNC CARE PLAN IN RCRD: CPT | Performed by: OBSTETRICS & GYNECOLOGY

## 2024-06-03 NOTE — PROGRESS NOTES
Subjective   Anna Smith is a 68 y.o. female here for GYN care.  She has a history of left breast cancer.  She is current on her imaging from 2023.    There is no postmenopausal bleeding or pelvic pain.  No dysuria, no change in bowel habits or vaginal discharge.    She does have a bone density test ordered next week with her PCP.    She is current on her colonoscopy.   Gynecologic History  Patient's last menstrual period was 2020.  Contraception: post menopausal status  Last Pap: 5/10/22. Results were: normal  Last mammogram: 23. Results were: normal    Obstetric History  OB History    Para Term  AB Living   0 0 0 0 0 0   SAB IAB Ectopic Multiple Live Births   0 0 0 0 0       Objective   Constitutional: Alert and in no acute distress. Well developed, well nourished.   Head and Face: Head and face: Normal.    Eyes: Normal external exam - nonicteric sclera, extraocular movements intact (EOMI) and no ptosis.   Neck: No neck asymmetry. Supple. Thyroid not enlarged and there were no palpable thyroid nodules.    Pulmonary: No respiratory distress.   Chest: Breasts: Normal appearance, no nipple discharge and no skin changes. Palpation of breasts and axillae: No palpable mass and no axillary lymphadenopathy.   Abdomen: Soft nontender; no abdominal mass palpated. No organomegaly. No hernias.   Genitourinary: External genitalia: Normal. No inguinal lymphadenopathy. Bartholin's Urethral and Skenes Glands: Normal. Urethra: Normal.  Bladder: Normal on palpation. Vagina: Normal. Cervix: Normal.  Uterus: Normal.  Right Adnexa/parametria: Normal.  Left Adnexa/parametria: Normal.  Inspection of Perianal Area: Normal.   Musculoskeletal: No joint swelling seen, normal movements of all extremities.   Skin: Normal skin color and pigmentation, normal skin turgor, and no rash.   Neurologic: Non-focal. Grossly intact.   Psychiatric: Alert and oriented x 3. Affect normal to patient baseline. Mood:  Appropriate.  Physical Exam     Assessment/Plan   This is a 68-year-old female with a normal exam.  No Pap was sent, she had a normal Pap in 2022 and no further Paps should be necessary.    Her mammogram is ordered with tomosynthesis.  She has a history of left breast cancer.    I will see her in 2 years, or sooner as needed.  Mammogram ordered.

## 2024-06-10 ENCOUNTER — HOSPITAL ENCOUNTER (OUTPATIENT)
Dept: RADIOLOGY | Facility: CLINIC | Age: 69
Discharge: HOME | End: 2024-06-10
Payer: MEDICARE

## 2024-06-10 DIAGNOSIS — Z78.0 ASYMPTOMATIC MENOPAUSAL STATE: ICD-10-CM

## 2024-06-10 PROCEDURE — 77081 DXA BONE DENSITY APPENDICULR: CPT | Performed by: RADIOLOGY

## 2024-06-10 PROCEDURE — 77080 DXA BONE DENSITY AXIAL: CPT

## 2024-07-16 ENCOUNTER — EVALUATION (OUTPATIENT)
Dept: PHYSICAL THERAPY | Facility: CLINIC | Age: 69
End: 2024-07-16
Payer: MEDICARE

## 2024-07-16 DIAGNOSIS — M41.9 SCOLIOSIS: Primary | ICD-10-CM

## 2024-07-16 DIAGNOSIS — M54.9 BACK PAIN, UNSPECIFIED BACK LOCATION, UNSPECIFIED BACK PAIN LATERALITY, UNSPECIFIED CHRONICITY: ICD-10-CM

## 2024-07-16 PROCEDURE — 97161 PT EVAL LOW COMPLEX 20 MIN: CPT | Mod: GP | Performed by: PHYSICAL THERAPIST

## 2024-07-16 PROCEDURE — 97110 THERAPEUTIC EXERCISES: CPT | Mod: GP | Performed by: PHYSICAL THERAPIST

## 2024-07-16 ASSESSMENT — PATIENT HEALTH QUESTIONNAIRE - PHQ9
1. LITTLE INTEREST OR PLEASURE IN DOING THINGS: NOT AT ALL
SUM OF ALL RESPONSES TO PHQ9 QUESTIONS 1 AND 2: 0
2. FEELING DOWN, DEPRESSED OR HOPELESS: NOT AT ALL

## 2024-07-16 ASSESSMENT — COLUMBIA-SUICIDE SEVERITY RATING SCALE - C-SSRS
2. HAVE YOU ACTUALLY HAD ANY THOUGHTS OF KILLING YOURSELF?: NO
6. HAVE YOU EVER DONE ANYTHING, STARTED TO DO ANYTHING, OR PREPARED TO DO ANYTHING TO END YOUR LIFE?: NO
1. IN THE PAST MONTH, HAVE YOU WISHED YOU WERE DEAD OR WISHED YOU COULD GO TO SLEEP AND NOT WAKE UP?: NO

## 2024-07-16 ASSESSMENT — ENCOUNTER SYMPTOMS
DEPRESSION: 0
OCCASIONAL FEELINGS OF UNSTEADINESS: 0
LOSS OF SENSATION IN FEET: 0

## 2024-07-16 NOTE — PROGRESS NOTES
"Physical Therapy Evaluation    Patient Name: Anna Smith  MRN: [id number]  Today's Date: 7/16/2024  Visit: 1/MN  Referred by: Dr. Connelly  Diagnosis:   1. Scoliosis        2. Back pain, unspecified back location, unspecified back pain laterality, unspecified chronicity  Referral to Physical Therapy          PRECAUTIONS:   Osteoporosis, OA, scoliosis, HTN    SUBJECTIVE:  Lifelong scoliosis for which she has managed well up until the recent 6 months or so. Notices her (L) hip juts out to the side more and some pain associated at this location. More recently has also noticed some lower (R) sided back pain upon standing from a chair. Denies N/T/W in the lower legs. Has  Pain:  (L) buttock/thigh avg 4/10 - sharp aching  (R) sided LBP 7-8/10 - sharp, quick  Home Living:  No concerns  Prior level of function:  25-30 miles of walking/hiking, still does HEP from prior bout of therapy.  Personal factors that may impact care:  Significant    OBJECTIVE:  Lumbar AROM: (% movement)   Flexion >75%   Extension 50%   Right Sidebend >75%   Left Sidebend >75%   Right Rotation >75%   Left Rotation >75%   *No radicular symptoms with sustained or repeated motions testing    Hip A/PROM WNL  Hip Strength: MMT Left Right   Flexion 4/5 4/5   Extension 4/5 4/5   Abduction 3+/5 4/5   External Rotation 3+/5 4/5     Core Strength: Sahrmann level 3+  Palpation: Increased tone and tenderness noted superior (L) gluteal mass - gluteus medius  Special Testing: - gluteal deterioration test LLE, - SLR (B)  Posture: (L) hip/innominate elevated as compared to (R) with notable \"S\" shaped curvature with levoscoliosis noted in the upper lumbar region.  Gait: Mild hip drop noted during LLE stance phase.  Outcome Measure:  Modified Oswestry 16%    ASSESSMENT:  Patient is a 68 year old female who presents to therapy on this date for evaluation of their (L) superior hip/gluteal pain in addition to their (R) sided LBP. Examination on this date reveals " signs and symptoms consistent with (L) gluteal tendinitis/osis and (R) lumbosacral stenotic pain. These deficits are leading to difficulties with ADLs as well as recreational activity. Skilled physical therapy will address these deficits to help reduce pain for return to prior level of function.    Problem list: decreased strength, decreased ROM, gait abnormalities, decreased flexibility, and altered posture.    Low complexity due to patient's clinical presentation being stable and uncomplicated by any significant comorbidities that may affect rehab tolerance and progression.     Clinical presentation:  Stable and/or uncomplicated characteristics,       TREATMENT:  - Therex:  Performance of all HEP to ensure proper form and understanding    PATIENT EDUCATION:  Outpatient Education  Individual(s) Educated: Patient  Education Provided: Anatomy, Body Mechanics, Home Exercise Program, Physiology, POC, Posture  Patient/Caregiver Demonstrated Understanding: yes  Plan of Care Discussed and Agreed Upon: yes  Patient Response to Education: Patient/Caregiver Verbalized Understanding of Information, Patient/Caregiver Performed Return Demonstration of Exercises/Activities, Patient/Caregiver Asked Appropriate Questions    HEP:  Access Code: [email or web address]  URL: [email or web address]  Date: 07/16/2024  Prepared by: Reynaldo Gonzalez    Exercises  - Seated Flexion Stretch  - 1-2 x daily - 7 x weekly - 1 sets - 10 reps - 10 sec hold  - Modified Cheng Stretch  - 1-2 x daily - 7 x weekly - 3 reps - 1-3 mins hold  - Hip Flexor Stretch with Chair  - 1-2 x daily - 7 x weekly - 10 reps - 10 sec hold  - Isometric Gluteus Medius at Wall (Mirrored)  - 1-2 x daily - 7 x weekly - 10 reps - 10 sec hold  - Sidelying Pelvic Floor Contraction with Hip Abduction  - 1-2 x daily - 7 x weekly - 3 sets - 10 reps    PLAN:   Treatment/Interventions: Education/ Instruction, Manual therapy, Neuromuscular re-education, Self care/ home  management, Therapeutic exercises, Therapeutic activities  PT Plan: Skilled PT  PT Frequency: 1 time per week  Duration: 8 weeks  Onset Date: 05/31/24  Certification Period Start Date: 07/16/24  Certification Period End Date: 10/14/24  Rehab Potential: Fair  Plan of Care Agreement: Patient    GOALS:  Active       PT Problem       Patient will ambulate >1 mile distance without increase in pain to resume recreational exercise.        Start:  07/16/24    Expected End:  09/10/24            Patient will achieve bilateral hip abduction strength of 4/5 or greater to stand/walk without (L) hip pain.       Start:  07/16/24    Expected End:  09/10/24            Patient will achieve bilateral hip external rotation strength of 4+/5 to improve tolerance to standing to be able to cook a meal without pain.       Start:  07/16/24    Expected End:  09/10/24            Patient will demonstrate independence in home program for support of progression       Start:  07/16/24    Expected End:  09/10/24            Patient will report max pain of 3/10 demonstrating a reduction of overall pain       Start:  07/16/24    Expected End:  09/10/24

## 2024-08-05 ENCOUNTER — LAB (OUTPATIENT)
Dept: LAB | Facility: LAB | Age: 69
End: 2024-08-05
Payer: MEDICARE

## 2024-08-05 ENCOUNTER — APPOINTMENT (OUTPATIENT)
Dept: PRIMARY CARE | Facility: CLINIC | Age: 69
End: 2024-08-05
Payer: MEDICARE

## 2024-08-05 VITALS
SYSTOLIC BLOOD PRESSURE: 126 MMHG | RESPIRATION RATE: 16 BRPM | BODY MASS INDEX: 21.83 KG/M2 | HEART RATE: 58 BPM | DIASTOLIC BLOOD PRESSURE: 72 MMHG | HEIGHT: 58 IN | WEIGHT: 104 LBS

## 2024-08-05 DIAGNOSIS — M81.0 OSTEOPOROSIS, UNSPECIFIED OSTEOPOROSIS TYPE, UNSPECIFIED PATHOLOGICAL FRACTURE PRESENCE: ICD-10-CM

## 2024-08-05 DIAGNOSIS — E55.9 VITAMIN D DEFICIENCY: Primary | ICD-10-CM

## 2024-08-05 DIAGNOSIS — C50.919 ADENOCARCINOMA OF BREAST, UNSPECIFIED LATERALITY (MULTI): ICD-10-CM

## 2024-08-05 DIAGNOSIS — M79.601 RIGHT ARM PAIN: ICD-10-CM

## 2024-08-05 DIAGNOSIS — E55.9 VITAMIN D DEFICIENCY: ICD-10-CM

## 2024-08-05 LAB
25(OH)D3 SERPL-MCNC: 65 NG/ML (ref 30–100)
PTH-INTACT SERPL-MCNC: 49.8 PG/ML (ref 18.5–88)

## 2024-08-05 PROCEDURE — 3078F DIAST BP <80 MM HG: CPT | Performed by: INTERNAL MEDICINE

## 2024-08-05 PROCEDURE — 82523 COLLAGEN CROSSLINKS: CPT

## 2024-08-05 PROCEDURE — 1157F ADVNC CARE PLAN IN RCRD: CPT | Performed by: INTERNAL MEDICINE

## 2024-08-05 PROCEDURE — 1158F ADVNC CARE PLAN TLK DOCD: CPT | Performed by: INTERNAL MEDICINE

## 2024-08-05 PROCEDURE — 3074F SYST BP LT 130 MM HG: CPT | Performed by: INTERNAL MEDICINE

## 2024-08-05 PROCEDURE — 82306 VITAMIN D 25 HYDROXY: CPT

## 2024-08-05 PROCEDURE — 83970 ASSAY OF PARATHORMONE: CPT

## 2024-08-05 PROCEDURE — 36415 COLL VENOUS BLD VENIPUNCTURE: CPT

## 2024-08-05 PROCEDURE — 3008F BODY MASS INDEX DOCD: CPT | Performed by: INTERNAL MEDICINE

## 2024-08-05 PROCEDURE — 1036F TOBACCO NON-USER: CPT | Performed by: INTERNAL MEDICINE

## 2024-08-05 PROCEDURE — 1123F ACP DISCUSS/DSCN MKR DOCD: CPT | Performed by: INTERNAL MEDICINE

## 2024-08-05 PROCEDURE — 99214 OFFICE O/P EST MOD 30 MIN: CPT | Performed by: INTERNAL MEDICINE

## 2024-08-05 RX ORDER — ALENDRONATE SODIUM 35 MG/1
35 TABLET ORAL
Qty: 4 TABLET | Refills: 1 | Status: SHIPPED | OUTPATIENT
Start: 2024-08-05 | End: 2025-08-05

## 2024-08-05 NOTE — PROGRESS NOTES
Subjective   Patient ID: Anna Smith is a 68 y.o. female who presents for Osteoporosis.    HPI  Patient in for a visit  Osteoporosis from bmd   Did get checked two years ago     Review of Systems  General: Denies fever, chills, night sweats,  Eyes: Negative for recent visual changes  Ears, Nose, Throat :  Negative for hearing changes, sinus discomfort  Dermatologic: Negative for new skin conditions, rash  Respiratory: Negative for wheezing, shortness of breath, cough  Cardiovascular: Negative for chest pain, palpitations, or leg swelling  Gastrointestinal: Negative for nausea/vomiting, abdominal pain, changes in bowel habits  Genitourinary Negative for Urinary Incontinence  urgency , frequency, discomfort   Musculoskeletal: see hpi  Neurological: Negative for headaches, dizziness    Previous history  Past Medical History:   Diagnosis Date    Asymptomatic menopausal state 04/15/2021    Asymptomatic menopausal state    Encounter for general adult medical examination without abnormal findings 10/28/2022    Encounter for Medicare annual wellness exam    Hemangioma of intra-abdominal structures 2017    Liver hemangioma    Other chest pain 2023    Chest discomfort    Other specified abnormalities of plasma proteins 2023    Elevated troponin    Personal history of other benign neoplasm 2021    History of other benign neoplasm    Sleep disorder, unspecified 2022    Sleep disorder    Unspecified injury of unspecified ankle, initial encounter     Ankle injury     Past Surgical History:   Procedure Laterality Date    BREAST LUMPECTOMY  2019    Breast Surgery Lumpectomy    CT ANGIO CORONARY ART WITH HEARTFLOW IF SCORE >30%  2022    CT HEART CORONARY ANGIOGRAM 2022 DOCTOR OFFICE [facility]     Social History     Tobacco Use    Smoking status: Former     Current packs/day: 0.00     Types: Cigarettes     Quit date:      Years since quittin.6    Smokeless tobacco: Never    Vaping Use    Vaping status: Never Used   Substance Use Topics    Alcohol use: Yes     Alcohol/week: 7.0 standard drinks of alcohol     Types: 7 Glasses of wine per week     Comment: 5-6 glasses of wine    Drug use: Never     Family History   Problem Relation Name Age of Onset    Other (Parkinsons) Mother  70 - 79    Other (Lewy Body) Mother      Heart attack Father      Coronary artery disease Sister      Breast cancer Sister  45    Coronary artery disease Brother      Hyperlipidemia Brother      Myasthenia gravis Brother      Coronary artery disease Brother      No Known Problems Brother      No Known Problems Sibling      Congenital heart disease Other Family Hx      No Known Allergies  Current Outpatient Medications   Medication Instructions    acetaminophen (TYLENOL) 650 mg, oral, As needed    amLODIPine (NORVASC) 5 mg, oral, Daily, for blood pressure    aspirin 81 mg, oral, Daily    atorvastatin (Lipitor) 40 mg tablet 1 tab po daily    calcium carbonate-vitamin D3 600 mg-5 mcg (200 unit) tablet 1 tablet, oral, Daily    cholecalciferol (Vitamin D-3) 50 MCG (2000 UT) tablet 2 tablets, oral, Daily    hydroCHLOROthiazide (HYDRODIURIL) 25 mg, oral, Daily    metoprolol tartrate (LOPRESSOR) 25 mg, oral, 2 times daily    MULTIVITAMIN ORAL oral       Objective       Physical Exam  Vital Signs: as recorded above  General: Well groomed, well nourished   Orientation:  Alert , oriented to time, place , and person   Mood and Affect:  Cooperative , no apparent distress normal affect  Skin: Good color, good turgor  Eyes: Extra ocular muscle movements intact, anicteric sclerae  Neck: Supple, full range of movement  Chest: Normal breath sounds, normal chest wall exam, symmetric, good air entry, clear to auscultation  Heart: Regular rate and rhythm, without murmur, gallop, or rubs  BACK:  no CTLS spine tenderness, no flank tenderness  Extremities: full range of movement  bilateral UE and bilateral LE,  no lower extremity  edema  Neurological: Alert, oriented, cranial nerves II-XII grossly intact except for visual acuity  Sensation:  Intact   Gait: normal steady      Assessment/Plan   Anna Smith is a 68 y.o. female who presents for the concerns below:    Problem List Items Addressed This Visit    None  OSTEOPOROSIS     Patient in needs refills on medication for osteoporosis.  Denies any stomach discomfort, side effects.  Reports walking/ weight bearing exercise but not regimented.  Takes calcium and vitamin D supplements.    HYPERTENSION PLAN: blood pressure better with recheck on manual sphygmomanometer  Follow low salt diet, exercise as discussed, weight control. Continue current medications or adjust accordingly. Obtain BMP, urine microalbumin, ophthalmology exam. goal < 130/80    Right arm pain she is doing PT for back will add this          Discussed with:   Return in :  3 month she will call if no problems with alendronate I will send to [facility] maggie   Portions of this note were generated using digital voice recognition software, and may contain grammatical errors       Tram Connelly MD  08/05/24  3:25 PM

## 2024-08-07 ENCOUNTER — TREATMENT (OUTPATIENT)
Dept: PHYSICAL THERAPY | Facility: CLINIC | Age: 69
End: 2024-08-07
Payer: MEDICARE

## 2024-08-07 DIAGNOSIS — M54.9 BACK PAIN, UNSPECIFIED BACK LOCATION, UNSPECIFIED BACK PAIN LATERALITY, UNSPECIFIED CHRONICITY: ICD-10-CM

## 2024-08-07 DIAGNOSIS — M41.9 SCOLIOSIS: ICD-10-CM

## 2024-08-07 LAB
COLLAGEN NTX/CREAT UR-SRTO: 28
CREAT UR-MCNC: 52 MG/DL

## 2024-08-07 PROCEDURE — 97110 THERAPEUTIC EXERCISES: CPT | Mod: GP | Performed by: PHYSICAL THERAPIST

## 2024-08-07 NOTE — PROGRESS NOTES
"Physical Therapy Treatment    Patient Name: Anna Smith  MRN: [id number]  Today's Date: 8/7/2024   Visit 2/MN  Time Calculation  Start Time: 1416  Stop Time: 1456  Time Calculation (min): 40 min  1. Back pain, unspecified back location, unspecified back pain laterality, unspecified chronicity  Follow Up In Physical Therapy      2. Scoliosis  Follow Up In Physical Therapy          PRECAUTIONS:      SUBJECTIVE:  Patient states that her symptoms may be slightly better. It is hard to say whether the improvement is more noticeable because her symptoms have been intermittent up to this point. HEP has gone well thus far. She is still able to do her hiking but this can lead to increased pain, at times.  Pain level: 0/10 at the moment.  Compliant with HEP? Yes    OBJECTIVE:      TREATMENT:  - Therex:  Rec Bike L5 x5 mins  Hip flexor stretch (B) 15\"x5 each  Lateral step up 8 inch 2x10 (B)  Hip abd/ext RTB with PPT 2x10 each  Supine DL bridge with RTB around knees 3x10  H/L alternating clamshells RTB 2x10 each  RSB bridge 2x10    ASSESSMENT:  Patient tolerated treatment well. Her HEP was progressed to build further strength in the B hips. Ongoing therapy may allow for further relief of symptoms through strength training.      EDUCATION:  Access Code: [email or web address]  URL: [email or web address]  Date: 08/07/2024  Prepared by: Reynaldo Gonzalez    Exercises  - Seated Flexion Stretch  - 1-2 x daily - 7 x weekly - 1 sets - 10 reps - 10 sec hold  - Hip Flexor Stretch with Chair  - 1-2 x daily - 7 x weekly - 10 reps - 10 sec hold  - Bridge with Hip Abduction and Resistance  - 1 x daily - 7 x weekly - 3 sets - 10 reps  - Hip Abduction with Resistance Loop  - 1 x daily - 7 x weekly - 3 sets - 10 reps  - Hip Extension with Resistance Loop  - 1 x daily - 7 x weekly - 3 sets - 10 reps  - Lateral Step Up  - 1 x daily - 7 x weekly - 3 sets - 10 reps    PLAN:   Follow-up in 2 weeks with intent to add in more aggressive hip " strengthening.

## 2024-08-27 DIAGNOSIS — M81.0 OSTEOPOROSIS, UNSPECIFIED OSTEOPOROSIS TYPE, UNSPECIFIED PATHOLOGICAL FRACTURE PRESENCE: ICD-10-CM

## 2024-08-27 RX ORDER — ALENDRONATE SODIUM 35 MG/1
35 TABLET ORAL
Qty: 12 TABLET | Refills: 1 | Status: SHIPPED | OUTPATIENT
Start: 2024-08-27 | End: 2024-08-28 | Stop reason: SDUPTHER

## 2024-08-28 ENCOUNTER — TREATMENT (OUTPATIENT)
Dept: PHYSICAL THERAPY | Facility: CLINIC | Age: 69
End: 2024-08-28
Payer: MEDICARE

## 2024-08-28 DIAGNOSIS — M81.0 OSTEOPOROSIS, UNSPECIFIED OSTEOPOROSIS TYPE, UNSPECIFIED PATHOLOGICAL FRACTURE PRESENCE: ICD-10-CM

## 2024-08-28 DIAGNOSIS — M41.9 SCOLIOSIS: ICD-10-CM

## 2024-08-28 DIAGNOSIS — M54.9 BACK PAIN, UNSPECIFIED BACK LOCATION, UNSPECIFIED BACK PAIN LATERALITY, UNSPECIFIED CHRONICITY: ICD-10-CM

## 2024-08-28 PROCEDURE — 97110 THERAPEUTIC EXERCISES: CPT | Mod: GP | Performed by: PHYSICAL THERAPIST

## 2024-08-28 RX ORDER — ALENDRONATE SODIUM 35 MG/1
35 TABLET ORAL
Qty: 12 TABLET | Refills: 3 | Status: SHIPPED | OUTPATIENT
Start: 2024-08-28 | End: 2025-08-28

## 2024-08-28 NOTE — PROGRESS NOTES
"Physical Therapy Treatment    Patient Name: Anna Smith  MRN: [id number]  Today's Date: 8/28/2024   Visit 3/MN  Time Calculation  Start Time: 1415  Stop Time: 1454  Time Calculation (min): 39 min  1. Back pain, unspecified back location, unspecified back pain laterality, unspecified chronicity  Follow Up In Physical Therapy      2. Scoliosis  Follow Up In Physical Therapy          PRECAUTIONS:      SUBJECTIVE:  Patient without significant improvement in therapy. She continues with the HEP but has not seen appreciable relief of symptoms.  Pain level: 0/10 at the moment.  Compliant with HEP? Yes    OBJECTIVE:  Noted S-shaped thoracolumbar scoliosis    TREATMENT:  - Therex:  Rec Bike L5 x5 mins  Hip flexor stretch (B) 15\"x5 each  (R) sidelying over HFR with pillow x3 mins  Hooklying LLE over RLE, LTR to the (R) 10\"x10  DKTC 30\"x5  Supine SL bridge 2x10  Sidebending mobilization against wall, LUE on wall 10\"x10    ASSESSMENT:  Patient tolerated treatment well. Her HEP was progressed to build further strength in the B hips. Ongoing therapy may allow for further relief of symptoms through strength training.      EDUCATION:  Access Code: [email or web address]  URL: [email or web address]  Date: 08/07/2024  Prepared by: Reynaldo Gonzalez    Exercises  - Seated Flexion Stretch  - 1-2 x daily - 7 x weekly - 1 sets - 10 reps - 10 sec hold  - Hip Flexor Stretch with Chair  - 1-2 x daily - 7 x weekly - 10 reps - 10 sec hold  - Bridge with Hip Abduction and Resistance  - 1 x daily - 7 x weekly - 3 sets - 10 reps  - Hip Abduction with Resistance Loop  - 1 x daily - 7 x weekly - 3 sets - 10 reps  - Hip Extension with Resistance Loop  - 1 x daily - 7 x weekly - 3 sets - 10 reps  - Lateral Step Up  - 1 x daily - 7 x weekly - 3 sets - 10 reps    PLAN:   Follow-up in 2 weeks with reassessment to determine if sustained stretching is beneficial to symptom relief. If no change, refer back to MD.      "

## 2024-08-28 NOTE — TELEPHONE ENCOUNTER
I’ve taken the fosomax  3 weeks in a row with no problems or side effects. Please send in a prescription to my mail order account. Thank you.

## 2024-09-03 ENCOUNTER — HOSPITAL ENCOUNTER (OUTPATIENT)
Dept: RADIOLOGY | Facility: CLINIC | Age: 69
Discharge: HOME | End: 2024-09-03
Payer: MEDICARE

## 2024-09-03 VITALS — WEIGHT: 105 LBS | HEIGHT: 58 IN | BODY MASS INDEX: 22.04 KG/M2

## 2024-09-03 DIAGNOSIS — Z01.419 ENCOUNTER FOR GYNECOLOGICAL EXAMINATION WITHOUT ABNORMAL FINDING: ICD-10-CM

## 2024-09-03 DIAGNOSIS — Z12.31 ENCOUNTER FOR SCREENING MAMMOGRAM FOR MALIGNANT NEOPLASM OF BREAST: ICD-10-CM

## 2024-09-03 PROCEDURE — 77067 SCR MAMMO BI INCL CAD: CPT | Performed by: STUDENT IN AN ORGANIZED HEALTH CARE EDUCATION/TRAINING PROGRAM

## 2024-09-03 PROCEDURE — 77067 SCR MAMMO BI INCL CAD: CPT

## 2024-09-03 PROCEDURE — 77063 BREAST TOMOSYNTHESIS BI: CPT | Performed by: STUDENT IN AN ORGANIZED HEALTH CARE EDUCATION/TRAINING PROGRAM

## 2024-09-09 ENCOUNTER — APPOINTMENT (OUTPATIENT)
Dept: PHYSICAL THERAPY | Facility: CLINIC | Age: 69
End: 2024-09-09
Payer: MEDICARE

## 2024-09-24 ENCOUNTER — TREATMENT (OUTPATIENT)
Dept: PHYSICAL THERAPY | Facility: CLINIC | Age: 69
End: 2024-09-24
Payer: MEDICARE

## 2024-09-24 DIAGNOSIS — M41.9 SCOLIOSIS: ICD-10-CM

## 2024-09-24 DIAGNOSIS — M54.9 BACK PAIN, UNSPECIFIED BACK LOCATION, UNSPECIFIED BACK PAIN LATERALITY, UNSPECIFIED CHRONICITY: ICD-10-CM

## 2024-09-24 PROCEDURE — 97110 THERAPEUTIC EXERCISES: CPT | Mod: GP | Performed by: PHYSICAL THERAPIST

## 2024-09-24 PROCEDURE — 97535 SELF CARE MNGMENT TRAINING: CPT | Mod: GP | Performed by: PHYSICAL THERAPIST

## 2024-09-24 NOTE — PROGRESS NOTES
"Physical Therapy Treatment    Patient Name: Anna Smith  MRN: [id number]  Today's Date: 9/24/2024   Visit 4/MN  Time Calculation  Start Time: 1047  Stop Time: 1120  Time Calculation (min): 33 min  1. Back pain, unspecified back location, unspecified back pain laterality, unspecified chronicity  Follow Up In Physical Therapy      2. Scoliosis  Follow Up In Physical Therapy          PRECAUTIONS:      SUBJECTIVE:  Patient states that the last HEP helped a little bit. She is experiencing less frequent pain as well as decreased maximum intensity. She still notices herself standing with weight shifted to one side due to her scoliosis. She is in agreement to continue with HEP and only return to therapy if drastic change occurs.  Pain level: 0/10 at the moment. Max 7/10  Compliant with HEP? Yes    OBJECTIVE:  Noted S-shaped thoracolumbar scoliosis    Hip Strength: MMT Left Right   Flexion 4/5 4/5   Extension 4/5 4/5   Abduction 4/5 4/5   External Rotation 4/5 4/5       TREATMENT:  - Therex:  Hip flexor stretch (B) 20\"x5 each  (R) sidelying over HFR with pillow x3 mins  Hooklying LLE over RLE, LTR to the (R) 10\"x10  Sidebending mobilization against wall, LUE on wall 10\"x10  Seated (R) side bend thoracolumbar stretch 10\"x10    - Manual therapy:  Passive inferior glides (L) iliac crest x5 mins    ASSESSMENT:  Patient tolerated treatment well. At this time, she would benefit most from continued performance of HEP and only return to PT if symptoms worsened. She will pursue consultation from spinal surgeon.      EDUCATION:  Access Code: [email or web address]      PLAN:   Hold on PT and have patient manage symptoms through HEP.  If pain is persistent, refer to spinal specialist for surgical consultation.    Resolved       PT Problem       Patient will ambulate >1 mile distance without increase in pain to resume recreational exercise.  (Not met)       Start:  07/16/24    Expected End:  09/10/24    Resolved:  09/24/24         Patient will " achieve bilateral hip abduction strength of 4/5 or greater to stand/walk without (L) hip pain. (Not met)       Start:  07/16/24    Expected End:  09/10/24    Resolved:  09/24/24         Patient will achieve bilateral hip external rotation strength of 4+/5 to improve tolerance to standing to be able to cook a meal without pain. (Not met)       Start:  07/16/24    Expected End:  09/10/24    Resolved:  09/24/24         Patient will demonstrate independence in home program for support of progression (Met)       Start:  07/16/24    Expected End:  09/10/24    Resolved:  09/24/24         Patient will report max pain of 3/10 demonstrating a reduction of overall pain (Not met)       Start:  07/16/24    Expected End:  09/10/24    Resolved:  09/24/24

## 2024-11-12 ENCOUNTER — APPOINTMENT (OUTPATIENT)
Dept: PRIMARY CARE | Facility: CLINIC | Age: 69
End: 2024-11-12
Payer: MEDICARE

## 2024-11-12 VITALS
DIASTOLIC BLOOD PRESSURE: 76 MMHG | WEIGHT: 106 LBS | SYSTOLIC BLOOD PRESSURE: 124 MMHG | HEIGHT: 57 IN | BODY MASS INDEX: 22.87 KG/M2

## 2024-11-12 DIAGNOSIS — I10 ESSENTIAL (PRIMARY) HYPERTENSION: ICD-10-CM

## 2024-11-12 DIAGNOSIS — R79.9 ABNORMAL BLOOD CELL COUNT: ICD-10-CM

## 2024-11-12 DIAGNOSIS — Z00.00 ROUTINE GENERAL MEDICAL EXAMINATION AT HEALTH CARE FACILITY: Primary | ICD-10-CM

## 2024-11-12 DIAGNOSIS — E78.00 ELEVATED LDL CHOLESTEROL LEVEL: ICD-10-CM

## 2024-11-12 PROCEDURE — 3074F SYST BP LT 130 MM HG: CPT | Performed by: INTERNAL MEDICINE

## 2024-11-12 PROCEDURE — 3008F BODY MASS INDEX DOCD: CPT | Performed by: INTERNAL MEDICINE

## 2024-11-12 PROCEDURE — 1157F ADVNC CARE PLAN IN RCRD: CPT | Performed by: INTERNAL MEDICINE

## 2024-11-12 PROCEDURE — 1159F MED LIST DOCD IN RCRD: CPT | Performed by: INTERNAL MEDICINE

## 2024-11-12 PROCEDURE — 1123F ACP DISCUSS/DSCN MKR DOCD: CPT | Performed by: INTERNAL MEDICINE

## 2024-11-12 PROCEDURE — 3078F DIAST BP <80 MM HG: CPT | Performed by: INTERNAL MEDICINE

## 2024-11-12 PROCEDURE — 1170F FXNL STATUS ASSESSED: CPT | Performed by: INTERNAL MEDICINE

## 2024-11-12 PROCEDURE — 1158F ADVNC CARE PLAN TLK DOCD: CPT | Performed by: INTERNAL MEDICINE

## 2024-11-12 PROCEDURE — 1036F TOBACCO NON-USER: CPT | Performed by: INTERNAL MEDICINE

## 2024-11-12 PROCEDURE — G0439 PPPS, SUBSEQ VISIT: HCPCS | Performed by: INTERNAL MEDICINE

## 2024-11-12 RX ORDER — ATORVASTATIN CALCIUM 40 MG/1
TABLET, FILM COATED ORAL
Qty: 90 TABLET | Refills: 1 | Status: SHIPPED | OUTPATIENT
Start: 2024-11-12

## 2024-11-12 RX ORDER — METOPROLOL TARTRATE 25 MG/1
25 TABLET, FILM COATED ORAL 2 TIMES DAILY
Qty: 180 TABLET | Refills: 1 | Status: SHIPPED | OUTPATIENT
Start: 2024-11-12

## 2024-11-12 ASSESSMENT — PATIENT HEALTH QUESTIONNAIRE - PHQ9
1. LITTLE INTEREST OR PLEASURE IN DOING THINGS: NOT AT ALL
2. FEELING DOWN, DEPRESSED OR HOPELESS: NOT AT ALL
2. FEELING DOWN, DEPRESSED OR HOPELESS: NOT AT ALL
SUM OF ALL RESPONSES TO PHQ9 QUESTIONS 1 AND 2: 0
1. LITTLE INTEREST OR PLEASURE IN DOING THINGS: NOT AT ALL
SUM OF ALL RESPONSES TO PHQ9 QUESTIONS 1 AND 2: 0

## 2024-11-12 ASSESSMENT — ACTIVITIES OF DAILY LIVING (ADL)
BATHING: INDEPENDENT
DRESSING: INDEPENDENT

## 2024-11-12 NOTE — PATIENT INSTRUCTIONS
To reset your pattern for 3-5 nights  , if without pain can use what your friend gave you   If with pain then take the tylenol PM   Or just plain benadryl without tylenol if no pain    We can also try Rozerem ([email or web address]) let me know this we would take for 7 nights in a row

## 2024-11-12 NOTE — PROGRESS NOTES
Subjective   Patient ID: Anna Smith is a 69 y.o. female who presents for Medicare Annual Wellness Visit Subsequent.    HPI  Patient in for a visit    Patient comes in for an [facility] Annual Wellness Visit  last one done over a year ago , doing well over-all with no particular complaints. Also is in for laboratory review and health maintenance update.  Updating family history as well.  Interval event - past medical history, surgical, social, and family history reviewed and updated.  Interval care -  Patient is    up to date with dental care.  Patient does     receive routine vision care.    Review of Systems  General: Denies fever, chills, night sweats,  Eyes: Negative for recent visual changes  Ears, Nose, Throat :  Negative for hearing changes, sinus discomfort  Dermatologic: Negative for new skin conditions, rash  Respiratory: Negative for wheezing, shortness of breath, cough  Cardiovascular: Negative for chest pain, palpitations, or leg swelling  Gastrointestinal: Negative for nausea/vomiting, abdominal pain, changes in bowel habits  Genitourinary Negative for Urinary Incontinence  urgency , frequency, discomfort   Musculoskeletal: see hpi  Neurological: Negative for headaches, dizziness    Previous history  Past Medical History:   Diagnosis Date    Asymptomatic menopausal state 04/15/2021    Asymptomatic menopausal state    Encounter for general adult medical examination without abnormal findings 10/28/2022    Encounter for Medicare annual wellness exam    Hemangioma of intra-abdominal structures 05/24/2017    Liver hemangioma    Hx antineoplastic chemo March 2003    Other chest pain 01/04/2023    Chest discomfort    Other specified abnormalities of plasma proteins 01/04/2023    Elevated troponin    Personal history of other benign neoplasm 07/13/2021    History of other benign neoplasm    Sleep disorder, unspecified 04/05/2022    Sleep disorder    Unspecified injury of unspecified ankle, initial encounter      Ankle injury     Past Surgical History:   Procedure Laterality Date    BREAST BIOPSY   & 2003    BREAST LUMPECTOMY  2019    Breast Surgery Lumpectomy    CT ANGIO CORONARY ART WITH HEARTFLOW IF SCORE >30%  2022    CT HEART CORONARY ANGIOGRAM 2022 DOCTOR OFFICE LEGACY     Social History     Tobacco Use    Smoking status: Former     Current packs/day: 0.00     Types: Cigarettes     Quit date:      Years since quittin.8    Smokeless tobacco: Never   Vaping Use    Vaping status: Never Used   Substance Use Topics    Alcohol use: Yes     Alcohol/week: 7.0 standard drinks of alcohol     Types: 7 Glasses of wine per week     Comment: 5-6 glasses of wine    Drug use: Never     Family History   Problem Relation Name Age of Onset    Other (Parkinsons) Mother  70 - 79    Other (Lewy Body) Mother      Heart attack Father      Coronary artery disease Sister Jennifer     Breast cancer Sister Jennifer 45    Coronary artery disease Brother      Hyperlipidemia Brother      Myasthenia gravis Brother      Coronary artery disease Brother      No Known Problems Brother      No Known Problems Sibling      Congenital heart disease Other Family Hx      No Known Allergies  Current Outpatient Medications   Medication Instructions    acetaminophen (TYLENOL) 650 mg, As needed    alendronate (FOSAMAX) 35 mg, oral, Every 7 days, Take in the morning with a full glass of water, on an empty stomach, and do not take anything else by mouth or lie down for the next 30 min.    amLODIPine (NORVASC) 5 mg, oral, Daily, for blood pressure    aspirin 81 mg, Daily    atorvastatin (Lipitor) 40 mg tablet 1 tab po daily    calcium carbonate-vitamin D3 600 mg-5 mcg (200 unit) tablet 1 tablet, Daily    cholecalciferol (Vitamin D-3) 50 MCG (2000 UT) tablet 2 tablets, Daily    hydroCHLOROthiazide (HYDRODIURIL) 25 mg, oral, Daily    metoprolol tartrate (LOPRESSOR) 25 mg, oral, 2 times daily    MULTIVITAMIN ORAL Take by mouth.        Objective       Physical Exam  Vital Signs: as recorded above  General: Well groomed, well nourished   Orientation:  Alert , oriented to time, place , and person   Mood and Affect:  Cooperative , no apparent distress normal affect  Skin: Good color, good turgor  Eyes: Extra ocular muscle movements intact, anicteric sclerae  Neck: Supple, full range of movement  Chest: Normal breath sounds, normal chest wall exam, symmetric, good air entry, clear to auscultation  Heart: Regular rate and rhythm, without murmur, gallop, or rubs  Abdomen soft nontender no masses felt no hepatosplenomegaly, no rebound or guarding  BACK:  no CTLS spine tenderness, scoliosis  no flank tenderness  Extremities: full range of movement  bilateral UE and bilateral LE,  no lower extremity edema  Neurological: Alert, oriented, cranial nerves II-XII grossly intact except for visual acuity  Sensation:  Intact   Gait: normal steady      Assessment/Plan   Anna Smith is a 69 y.o. female who presents for the concerns below:    Problem List Items Addressed This Visit    None    HYPERCHOLESTEROLEMIA PLAN: stable  continue current medications  Follow low cholesterol diet, encouraged high omega 3 fatty acid intake in diet, exercise, weight control. . Will Obtain AST/ALT, fasting lipid profile, creatine kinase  on statin if not done within past 3-6 months . Coenzyme Q10 200 mg a day if able to help increase HDL.    To reset your pattern for 3-5 nights  , if without pain can use what your friend gave you   If with pain then take the tylenol PM   Or just plain benadryl without tylenol if no pain    We can also try Rozerem (ramelteon) let me know this we would take for 7 nights in a row      Annual Wellness Visit  the risks and benefits of influenza vaccination were discussed with the patient, influenza vaccine is up to date this year  the risks and benefits of Prevnar 20  vaccination were discussed with the patient, Prevnar 20 vaccine is    up to  date  the risks and benefits of pneumonia vaccination were discussed with the patient, pneumonia vaccine is     up to date   the risks and benefits of  Shingrix  vaccination were discussed with the patient, Shingrix  vaccine is   not     up to date there is a shortage of the vaccine  the risks and benefits of tetanus vaccination were discussed with the patient, tetanus vaccine is      up to date   Cardiovascular screening and counseling the risk and benefits of screening were discussed counseling on maintaining a healthy diet and due for lipid panel  Breast cancer screening and counseling , the risks and benefits of screening were discussed with the patient, and screening is current     order is in  Cervical cancer screening and counseling , the risks and benefits of screening were discussed with the patient, and screening is current     order is in  Osteoporosis screening and counseling was given on obtaining adequate amounts of calcium and vitamin D on a daily basis and weight bearing exercise such as walking  , the risks and benefits of screening were discussed with the patient, and screening is current     order is in  Colorectal cancer screening and counseling; the risks and benefits of screening were discussed with the patient, colon cancer screening is     up to date , order is in   Abdominal aortic aneurysm screening and counseling,  the risks and benefits of screening were discussed with the patient, screening is not indicated   Visual acuity / Glaucoma screening and counseling , the risks and benefits of vision screening were discussed with the patient, screening is current   HIV screening and Counseling, the risks and benefits of screening were discussed with the patient, screening is not indicated   Advance directive planning : needs to be updated information forms given to patient .  complete and up  to date   Other Preventive Counseling Provided :  fall risk reduction  seat belt use, sunscreen use ,  and increasing physical activity .  Patient Discussion:  plan discussed with the patient and follow-up visit needed       Discussed with:   Return in :    Portions of this note were generated using digital voice recognition software, and may contain grammatical errors       Tram Connelly MD  11/12/24  10:06 AM

## 2024-11-19 ENCOUNTER — LAB (OUTPATIENT)
Dept: LAB | Facility: LAB | Age: 69
End: 2024-11-19
Payer: MEDICARE

## 2024-11-19 DIAGNOSIS — R79.9 ABNORMAL BLOOD CELL COUNT: ICD-10-CM

## 2024-11-19 DIAGNOSIS — E78.00 ELEVATED LDL CHOLESTEROL LEVEL: ICD-10-CM

## 2024-11-19 LAB
ALBUMIN SERPL BCP-MCNC: 4.4 G/DL (ref 3.4–5)
ALP SERPL-CCNC: 53 U/L (ref 33–136)
ALT SERPL W P-5'-P-CCNC: 24 U/L (ref 7–45)
ANION GAP SERPL CALC-SCNC: 13 MMOL/L (ref 10–20)
AST SERPL W P-5'-P-CCNC: 23 U/L (ref 9–39)
BASOPHILS # BLD AUTO: 0.06 X10*3/UL (ref 0–0.1)
BASOPHILS NFR BLD AUTO: 1.1 %
BILIRUB SERPL-MCNC: 0.5 MG/DL (ref 0–1.2)
BUN SERPL-MCNC: 17 MG/DL (ref 6–23)
CALCIUM SERPL-MCNC: 10 MG/DL (ref 8.6–10.6)
CHLORIDE SERPL-SCNC: 101 MMOL/L (ref 98–107)
CO2 SERPL-SCNC: 30 MMOL/L (ref 21–32)
CREAT SERPL-MCNC: 0.63 MG/DL (ref 0.5–1.05)
EGFRCR SERPLBLD CKD-EPI 2021: >90 ML/MIN/1.73M*2
EOSINOPHIL # BLD AUTO: 0.07 X10*3/UL (ref 0–0.4)
EOSINOPHIL NFR BLD AUTO: 1.3 %
ERYTHROCYTE [DISTWIDTH] IN BLOOD BY AUTOMATED COUNT: 13.8 % (ref 11.5–14.5)
GLUCOSE SERPL-MCNC: 138 MG/DL (ref 74–99)
HCT VFR BLD AUTO: 38.1 % (ref 36–46)
HGB BLD-MCNC: 12.4 G/DL (ref 12–16)
IMM GRANULOCYTES # BLD AUTO: 0.01 X10*3/UL (ref 0–0.5)
IMM GRANULOCYTES NFR BLD AUTO: 0.2 % (ref 0–0.9)
LYMPHOCYTES # BLD AUTO: 2.03 X10*3/UL (ref 0.8–3)
LYMPHOCYTES NFR BLD AUTO: 37.9 %
MCH RBC QN AUTO: 30 PG (ref 26–34)
MCHC RBC AUTO-ENTMCNC: 32.5 G/DL (ref 32–36)
MCV RBC AUTO: 92 FL (ref 80–100)
MONOCYTES # BLD AUTO: 0.33 X10*3/UL (ref 0.05–0.8)
MONOCYTES NFR BLD AUTO: 6.2 %
NEUTROPHILS # BLD AUTO: 2.86 X10*3/UL (ref 1.6–5.5)
NEUTROPHILS NFR BLD AUTO: 53.3 %
NRBC BLD-RTO: 0 /100 WBCS (ref 0–0)
PLATELET # BLD AUTO: 343 X10*3/UL (ref 150–450)
POTASSIUM SERPL-SCNC: 3.6 MMOL/L (ref 3.5–5.3)
PROT SERPL-MCNC: 6.9 G/DL (ref 6.4–8.2)
RBC # BLD AUTO: 4.14 X10*6/UL (ref 4–5.2)
SODIUM SERPL-SCNC: 140 MMOL/L (ref 136–145)
TSH SERPL-ACNC: 2.83 MIU/L (ref 0.44–3.98)
WBC # BLD AUTO: 5.4 X10*3/UL (ref 4.4–11.3)

## 2024-11-19 PROCEDURE — 85025 COMPLETE CBC W/AUTO DIFF WBC: CPT

## 2024-11-19 PROCEDURE — 84443 ASSAY THYROID STIM HORMONE: CPT

## 2024-11-19 PROCEDURE — 36415 COLL VENOUS BLD VENIPUNCTURE: CPT

## 2024-11-19 PROCEDURE — 80053 COMPREHEN METABOLIC PANEL: CPT

## 2025-01-31 DIAGNOSIS — M81.0 OSTEOPOROSIS, UNSPECIFIED OSTEOPOROSIS TYPE, UNSPECIFIED PATHOLOGICAL FRACTURE PRESENCE: ICD-10-CM

## 2025-01-31 RX ORDER — ALENDRONATE SODIUM TABLET 35 MG/1
35 TABLET ORAL
Qty: 12 TABLET | Refills: 3 | Status: SHIPPED | OUTPATIENT
Start: 2025-01-31 | End: 2026-01-31

## 2025-01-31 NOTE — TELEPHONE ENCOUNTER
I have changed my PDP & prescription refills should now be sent to [facility].  I will need a refill of Alendronate Sodium within a couple of weeks.     Thank you.  Anna Smith     [phone]  [email or web address]

## 2025-03-02 DIAGNOSIS — I25.10 ATHEROSCLEROTIC HEART DISEASE OF NATIVE CORONARY ARTERY WITHOUT ANGINA PECTORIS: ICD-10-CM

## 2025-03-03 RX ORDER — HYDROCHLOROTHIAZIDE 25 MG/1
25 TABLET ORAL DAILY
Qty: 90 TABLET | Refills: 3 | Status: SHIPPED | OUTPATIENT
Start: 2025-03-03 | End: 2026-03-03

## 2025-03-13 ASSESSMENT — ENCOUNTER SYMPTOMS
SWEATS: 0
SHORTNESS OF BREATH: 0
ORTHOPNEA: 0
HEADACHES: 0
BLURRED VISION: 0
PND: 0
HYPERTENSION: 1
NECK PAIN: 0
PALPITATIONS: 0

## 2025-03-14 ENCOUNTER — APPOINTMENT (OUTPATIENT)
Dept: PRIMARY CARE | Facility: CLINIC | Age: 70
End: 2025-03-14
Payer: MEDICARE

## 2025-03-18 ENCOUNTER — APPOINTMENT (OUTPATIENT)
Dept: PRIMARY CARE | Facility: CLINIC | Age: 70
End: 2025-03-18
Payer: MEDICARE

## 2025-03-18 VITALS
SYSTOLIC BLOOD PRESSURE: 130 MMHG | WEIGHT: 106 LBS | HEIGHT: 57 IN | DIASTOLIC BLOOD PRESSURE: 76 MMHG | BODY MASS INDEX: 22.87 KG/M2

## 2025-03-18 DIAGNOSIS — Z13.228 SCREENING FOR ENDOCRINE/METABOLIC/IMMUNITY DISORDERS: ICD-10-CM

## 2025-03-18 DIAGNOSIS — C50.919 ADENOCARCINOMA OF BREAST, UNSPECIFIED LATERALITY (MULTI): ICD-10-CM

## 2025-03-18 DIAGNOSIS — Z13.0 SCREENING FOR ENDOCRINE, METABOLIC AND IMMUNITY DISORDER: ICD-10-CM

## 2025-03-18 DIAGNOSIS — R79.9 ABNORMAL BLOOD CHEMISTRY: Primary | ICD-10-CM

## 2025-03-18 DIAGNOSIS — Z13.29 SCREENING FOR ENDOCRINE, METABOLIC AND IMMUNITY DISORDER: ICD-10-CM

## 2025-03-18 DIAGNOSIS — Z13.6 SCREENING FOR CARDIOVASCULAR CONDITION: ICD-10-CM

## 2025-03-18 DIAGNOSIS — Z13.228 SCREENING FOR ENDOCRINE, METABOLIC AND IMMUNITY DISORDER: ICD-10-CM

## 2025-03-18 DIAGNOSIS — Z13.0 SCREENING FOR ENDOCRINE/METABOLIC/IMMUNITY DISORDERS: ICD-10-CM

## 2025-03-18 DIAGNOSIS — I25.10 ATHEROSCLEROTIC HEART DISEASE OF NATIVE CORONARY ARTERY WITHOUT ANGINA PECTORIS: ICD-10-CM

## 2025-03-18 DIAGNOSIS — I10 HYPERTENSION, UNSPECIFIED TYPE: ICD-10-CM

## 2025-03-18 DIAGNOSIS — Z13.29 SCREENING FOR ENDOCRINE/METABOLIC/IMMUNITY DISORDERS: ICD-10-CM

## 2025-03-18 DIAGNOSIS — I10 ESSENTIAL (PRIMARY) HYPERTENSION: ICD-10-CM

## 2025-03-18 DIAGNOSIS — E78.00 ELEVATED LDL CHOLESTEROL LEVEL: ICD-10-CM

## 2025-03-18 PROCEDURE — 1158F ADVNC CARE PLAN TLK DOCD: CPT | Performed by: INTERNAL MEDICINE

## 2025-03-18 PROCEDURE — 3008F BODY MASS INDEX DOCD: CPT | Performed by: INTERNAL MEDICINE

## 2025-03-18 PROCEDURE — 3075F SYST BP GE 130 - 139MM HG: CPT | Performed by: INTERNAL MEDICINE

## 2025-03-18 PROCEDURE — 1123F ACP DISCUSS/DSCN MKR DOCD: CPT | Performed by: INTERNAL MEDICINE

## 2025-03-18 PROCEDURE — 99214 OFFICE O/P EST MOD 30 MIN: CPT | Performed by: INTERNAL MEDICINE

## 2025-03-18 PROCEDURE — 3078F DIAST BP <80 MM HG: CPT | Performed by: INTERNAL MEDICINE

## 2025-03-18 PROCEDURE — 1157F ADVNC CARE PLAN IN RCRD: CPT | Performed by: INTERNAL MEDICINE

## 2025-03-18 RX ORDER — ATORVASTATIN CALCIUM 40 MG/1
TABLET, FILM COATED ORAL
Qty: 90 TABLET | Refills: 1 | Status: SHIPPED | OUTPATIENT
Start: 2025-03-18

## 2025-03-18 RX ORDER — AMLODIPINE BESYLATE 5 MG/1
5 TABLET ORAL DAILY
Qty: 90 TABLET | Refills: 2 | Status: SHIPPED | OUTPATIENT
Start: 2025-03-18

## 2025-03-18 RX ORDER — METOPROLOL TARTRATE 25 MG/1
25 TABLET, FILM COATED ORAL 2 TIMES DAILY
Qty: 180 TABLET | Refills: 1 | Status: SHIPPED | OUTPATIENT
Start: 2025-03-18

## 2025-03-18 ASSESSMENT — LIFESTYLE VARIABLES
HOW OFTEN DO YOU HAVE A DRINK CONTAINING ALCOHOL: 2-3 TIMES A WEEK
HOW MANY STANDARD DRINKS CONTAINING ALCOHOL DO YOU HAVE ON A TYPICAL DAY: 1 OR 2

## 2025-03-18 NOTE — PROGRESS NOTES
Subjective   Patient ID: Anna Smith is a 69 y.o. female who presents for Follow-up.    Hypertension  This is a chronic problem. The current episode started more than 1 year ago. The problem has been resolved since onset. The problem is controlled. Pertinent negatives include no anxiety, blurred vision, chest pain, headaches, malaise/fatigue, neck pain, orthopnea, palpitations, peripheral edema, PND, shortness of breath or sweats. Agents associated with hypertension include NSAIDs. Risk factors for coronary artery disease include dyslipidemia, family history and post-menopausal state. There are no compliance problems.      Patient in for a visit  Hiking with spikes during winter icy  Glucose up , but she was not fasting   First year of fosamax bmd next week    Review of Systems   Constitutional:  Negative for malaise/fatigue.   Eyes:  Negative for blurred vision.   Respiratory:  Negative for shortness of breath.    Cardiovascular:  Negative for chest pain, palpitations, orthopnea and PND.   Musculoskeletal:  Negative for neck pain.   Neurological:  Negative for headaches.     General: Denies fever, chills, night sweats,  Eyes: Negative for recent visual changes  Ears, Nose, Throat :  Negative for hearing changes, sinus discomfort  Dermatologic: Negative for new skin conditions, rash  Respiratory: Negative for wheezing, shortness of breath, cough  Cardiovascular: Negative for chest pain, palpitations, or leg swelling  Gastrointestinal: Negative for nausea/vomiting, abdominal pain, changes in bowel habits  Genitourinary Negative for Urinary Incontinence  urgency , frequency, discomfort   Musculoskeletal: see hpi  Neurological: Negative for headaches, dizziness    Previous history  Past Medical History:   Diagnosis Date    Asymptomatic menopausal state 04/15/2021    Asymptomatic menopausal state    Encounter for general adult medical examination without abnormal findings 10/28/2022    Encounter for Medicare annual  wellness exam    Hemangioma of intra-abdominal structures 2017    Liver hemangioma    Hx antineoplastic chemo 2003    Other chest pain 2023    Chest discomfort    Other specified abnormalities of plasma proteins 2023    Elevated troponin    Personal history of other benign neoplasm 2021    History of other benign neoplasm    Sleep disorder, unspecified 2022    Sleep disorder    Unspecified injury of unspecified ankle, initial encounter     Ankle injury     Past Surgical History:   Procedure Laterality Date    BREAST BIOPSY   &     BREAST LUMPECTOMY  2019    Breast Surgery Lumpectomy    CT ANGIO CORONARY ART WITH HEARTFLOW IF SCORE >30%  2022    CT HEART CORONARY ANGIOGRAM 2022 DOCTOR OFFICE LEGACY     Social History     Tobacco Use    Smoking status: Former     Current packs/day: 0.00     Types: Cigarettes     Quit date:      Years since quittin.2     Passive exposure: Never    Smokeless tobacco: Never   Vaping Use    Vaping status: Never Used   Substance Use Topics    Alcohol use: Yes     Alcohol/week: 7.0 standard drinks of alcohol     Types: 7 Glasses of wine per week     Comment: 5-6 glasses of wine    Drug use: Never     Family History   Problem Relation Name Age of Onset    Other (Parkinsons) Mother  70 - 79    Other (Lewy Body) Mother      Heart attack Father      Coronary artery disease Sister Jennifer     Breast cancer Sister Jennifer 45    Coronary artery disease Brother      Hyperlipidemia Brother      Myasthenia gravis Brother      Coronary artery disease Brother      No Known Problems Brother      No Known Problems Sibling      Congenital heart disease Other Family Hx      No Known Allergies  Current Outpatient Medications   Medication Instructions    acetaminophen (TYLENOL) 650 mg, As needed    alendronate (FOSAMAX) 35 mg, oral, Every 7 days, Take in the morning with a full glass of water, on an empty stomach, and do not take anything  else by mouth or lie down for the next 30 min.    amLODIPine (NORVASC) 5 mg, oral, Daily, for blood pressure    aspirin 81 mg, Daily    atorvastatin (Lipitor) 40 mg tablet 1 tab po daily    calcium carbonate-vitamin D3 600 mg-5 mcg (200 unit) tablet 1 tablet, Daily    cholecalciferol (Vitamin D-3) 50 MCG (2000 UT) tablet 2 tablets, Daily    hydroCHLOROthiazide (HYDRODIURIL) 25 mg, oral, Daily    metoprolol tartrate (LOPRESSOR) 25 mg, oral, 2 times daily    MULTIVITAMIN ORAL Take by mouth.       Objective       Physical Exam  Vital Signs: as recorded above  General: Well groomed, well nourished   Orientation:  Alert , oriented to time, place , and person   Mood and Affect:  Cooperative , no apparent distress normal affect  Skin: Good color, good turgor  Eyes: Extra ocular muscle movements intact, anicteric sclerae  Neck: Supple, full range of movement  Chest: Normal breath sounds, normal chest wall exam, symmetric, good air entry, clear to auscultation  Heart: Regular rate and rhythm, without murmur, gallop, or rubs  BACK:  no CTLS spine tenderness, no flank tenderness  Extremities: full range of movement  bilateral UE and bilateral LE,  no lower extremity edema  Neurological: Alert, oriented, cranial nerves II-XII grossly intact except for visual acuity  Sensation:  Intact   Gait: normal steady      Assessment/Plan   Anna Smith is a 69 y.o. female who presents for the concerns below:    Problem List Items Addressed This Visit       Adenocarcinoma of breast (Multi)     Utd with mammogram     HYPERTENSION PLAN:  , taking blood pressure medication  Follow low salt diet, exercise as discussed, weight control. Continue current medications  HYPERCHOLESTEROLEMIA PLAN: stable  continue current medications  Follow low cholesterol diet, encouraged high omega 3 fatty acid intake in diet, exercise, weight control. . Will Obtain AST/ALT, fasting lipid profile, creatine kinase  on statin if not done within past 3-6 months .  Coenzyme Q10 200 mg a day if able to help increase HDL.  HYPERGLYCEMIA PLAN:Stable without  medications currently  , continue to follow Diabetic diet,  urine microalbumin utd , ophthalmology exam.  Need Podiatry checks periodically.             Discussed with:   Return in :    Portions of this note were generated using digital voice recognition software, and may contain grammatical errors       Tram Connelly MD  03/18/25  11:06 AM

## 2025-03-20 ENCOUNTER — APPOINTMENT (OUTPATIENT)
Dept: PRIMARY CARE | Facility: CLINIC | Age: 70
End: 2025-03-20
Payer: MEDICARE

## 2025-03-21 ASSESSMENT — ENCOUNTER SYMPTOMS
PND: 0
HYPERTENSION: 1
NECK PAIN: 0
HEADACHES: 0
ORTHOPNEA: 0
SWEATS: 0
BLURRED VISION: 0
SHORTNESS OF BREATH: 0
PALPITATIONS: 0

## 2025-04-18 DIAGNOSIS — I10 HYPERTENSION, UNSPECIFIED TYPE: ICD-10-CM

## 2025-04-18 DIAGNOSIS — Z13.29 SCREENING FOR ENDOCRINE, METABOLIC AND IMMUNITY DISORDER: ICD-10-CM

## 2025-04-18 DIAGNOSIS — Z13.0 SCREENING FOR ENDOCRINE/METABOLIC/IMMUNITY DISORDERS: ICD-10-CM

## 2025-04-18 DIAGNOSIS — I10 ESSENTIAL (PRIMARY) HYPERTENSION: ICD-10-CM

## 2025-04-18 DIAGNOSIS — R79.9 ABNORMAL BLOOD CHEMISTRY: ICD-10-CM

## 2025-04-18 DIAGNOSIS — Z13.228 SCREENING FOR ENDOCRINE/METABOLIC/IMMUNITY DISORDERS: ICD-10-CM

## 2025-04-18 DIAGNOSIS — Z13.6 SCREENING FOR CARDIOVASCULAR CONDITION: ICD-10-CM

## 2025-04-18 DIAGNOSIS — Z13.29 SCREENING FOR ENDOCRINE/METABOLIC/IMMUNITY DISORDERS: ICD-10-CM

## 2025-04-18 DIAGNOSIS — Z13.228 SCREENING FOR ENDOCRINE, METABOLIC AND IMMUNITY DISORDER: ICD-10-CM

## 2025-04-18 DIAGNOSIS — Z13.0 SCREENING FOR ENDOCRINE, METABOLIC AND IMMUNITY DISORDER: ICD-10-CM

## 2025-05-13 ENCOUNTER — APPOINTMENT (OUTPATIENT)
Dept: CARDIOLOGY | Facility: HOSPITAL | Age: 70
End: 2025-05-13
Payer: MEDICARE

## 2025-05-30 LAB
ALBUMIN SERPL-MCNC: 4.8 G/DL (ref 3.6–5.1)
ALBUMIN/CREAT UR: 15 MG/G CREAT
ALP SERPL-CCNC: 54 U/L (ref 37–153)
ALT SERPL-CCNC: 33 U/L (ref 6–29)
ANION GAP SERPL CALCULATED.4IONS-SCNC: 10 MMOL/L (CALC) (ref 7–17)
AST SERPL-CCNC: 27 U/L (ref 10–35)
BILIRUB SERPL-MCNC: 0.6 MG/DL (ref 0.2–1.2)
BUN SERPL-MCNC: 17 MG/DL (ref 7–25)
CALCIUM SERPL-MCNC: 9.8 MG/DL (ref 8.6–10.4)
CHLORIDE SERPL-SCNC: 101 MMOL/L (ref 98–110)
CHOLEST SERPL-MCNC: 152 MG/DL
CHOLEST/HDLC SERPL: 1.9 (CALC)
CO2 SERPL-SCNC: 28 MMOL/L (ref 20–32)
CREAT SERPL-MCNC: 0.56 MG/DL (ref 0.6–1)
CREAT UR-MCNC: 53 MG/DL (ref 20–275)
EGFRCR SERPLBLD CKD-EPI 2021: 98 ML/MIN/1.73M2
EST. AVERAGE GLUCOSE BLD GHB EST-MCNC: 128 MG/DL
EST. AVERAGE GLUCOSE BLD GHB EST-SCNC: 7.1 MMOL/L
GLUCOSE SERPL-MCNC: 92 MG/DL (ref 65–99)
HBA1C MFR BLD: 6.1 %
HDLC SERPL-MCNC: 81 MG/DL
LDLC SERPL CALC-MCNC: 56 MG/DL (CALC)
MICROALBUMIN UR-MCNC: 0.8 MG/DL
NONHDLC SERPL-MCNC: 71 MG/DL (CALC)
POTASSIUM SERPL-SCNC: 3.8 MMOL/L (ref 3.5–5.3)
PROT SERPL-MCNC: 7.5 G/DL (ref 6.1–8.1)
SODIUM SERPL-SCNC: 139 MMOL/L (ref 135–146)
TRIGL SERPL-MCNC: 66 MG/DL
TSH SERPL-ACNC: 3.36 MIU/L (ref 0.4–4.5)

## 2025-06-04 ENCOUNTER — APPOINTMENT (OUTPATIENT)
Dept: CARDIOLOGY | Facility: HOSPITAL | Age: 70
End: 2025-06-04
Payer: MEDICARE

## 2025-06-04 VITALS
WEIGHT: 105.8 LBS | OXYGEN SATURATION: 96 % | DIASTOLIC BLOOD PRESSURE: 78 MMHG | HEART RATE: 58 BPM | SYSTOLIC BLOOD PRESSURE: 117 MMHG | BODY MASS INDEX: 22.83 KG/M2 | HEIGHT: 57 IN | RESPIRATION RATE: 16 BRPM

## 2025-06-04 DIAGNOSIS — I25.10 CORONARY ARTERIOSCLEROSIS: Primary | ICD-10-CM

## 2025-06-04 DIAGNOSIS — E78.00 ELEVATED LDL CHOLESTEROL LEVEL: ICD-10-CM

## 2025-06-04 DIAGNOSIS — I10 PRIMARY HYPERTENSION: ICD-10-CM

## 2025-06-04 PROCEDURE — 3078F DIAST BP <80 MM HG: CPT | Performed by: INTERNAL MEDICINE

## 2025-06-04 PROCEDURE — 93005 ELECTROCARDIOGRAM TRACING: CPT | Performed by: INTERNAL MEDICINE

## 2025-06-04 PROCEDURE — 99214 OFFICE O/P EST MOD 30 MIN: CPT | Performed by: INTERNAL MEDICINE

## 2025-06-04 PROCEDURE — 1160F RVW MEDS BY RX/DR IN RCRD: CPT | Performed by: INTERNAL MEDICINE

## 2025-06-04 PROCEDURE — 1036F TOBACCO NON-USER: CPT | Performed by: INTERNAL MEDICINE

## 2025-06-04 PROCEDURE — 99212 OFFICE O/P EST SF 10 MIN: CPT | Performed by: INTERNAL MEDICINE

## 2025-06-04 PROCEDURE — 1159F MED LIST DOCD IN RCRD: CPT | Performed by: INTERNAL MEDICINE

## 2025-06-04 PROCEDURE — 3008F BODY MASS INDEX DOCD: CPT | Performed by: INTERNAL MEDICINE

## 2025-06-04 PROCEDURE — 3074F SYST BP LT 130 MM HG: CPT | Performed by: INTERNAL MEDICINE

## 2025-06-04 PROCEDURE — G2211 COMPLEX E/M VISIT ADD ON: HCPCS | Performed by: INTERNAL MEDICINE

## 2025-06-04 NOTE — PROGRESS NOTES
Primary Care Physician: Tram Connelly MD  Date of Visit: 06/04/2025  2:40 PM EDT  Location of visit: [facility]     Chief Complaint:   No chief complaint on file.       HPI / Summary:   Anna Smith is a 69 y.o. female who presents for followup.  She has no complaints.  She exercises regularly and hikes 5 miles 5 days a week and a hiking club.  The patient denies chest pain, shortness of breath, palpitations, lightheadedness, syncope, orthopnea, paroxysmal nocturnal dyspnea, lower extremity edema, or bleeding problems.    She developed myalgias at 80 mg of atorvastatin.        Past Medical History:   Diagnosis Date    Asymptomatic menopausal state 04/15/2021    Asymptomatic menopausal state    Encounter for general adult medical examination without abnormal findings 10/28/2022    Encounter for Medicare annual wellness exam    Hemangioma of intra-abdominal structures 05/24/2017    Liver hemangioma    Hx antineoplastic chemo March 2003    Other chest pain 01/04/2023    Chest discomfort    Other specified abnormalities of plasma proteins 01/04/2023    Elevated troponin    Personal history of other benign neoplasm 07/13/2021    History of other benign neoplasm    Sleep disorder, unspecified 04/05/2022    Sleep disorder    Unspecified injury of unspecified ankle, initial encounter     Ankle injury        Past Surgical History:   Procedure Laterality Date    BREAST BIOPSY  2002 & 2003    BREAST LUMPECTOMY  02/11/2019    Breast Surgery Lumpectomy    CT ANGIO CORONARY ART WITH HEARTFLOW IF SCORE >30%  12/21/2022    CT HEART CORONARY ANGIOGRAM 12/21/2022 DOCTOR OFFICE LEGAC[facility]          Social History:   reports that she quit smoking about 52 years ago. Her smoking use included cigarettes. She has never been exposed to tobacco smoke. She has never used smokeless tobacco. She reports current alcohol use of about 7.0 standard drinks of alcohol per week. She reports that she does not use drugs.     Family  "History:  family history includes Breast cancer (age of onset: 45) in her sister; Congenital heart disease in an other family member; Coronary artery disease in her brother, brother, and sister; Heart attack in her father; Hyperlipidemia in her brother; Lewy Body in her mother; Myasthenia gravis in her brother; No Known Problems in her brother and sibling; Parkinsons (age of onset: 70 - 79) in her mother.      Allergies:  No Known Allergies    Outpatient Medications:  Current Outpatient Medications   Medication Instructions    acetaminophen (TYLENOL) 650 mg, As needed    alendronate (FOSAMAX) 35 mg, oral, Every 7 days, Take in the morning with a full glass of water, on an empty stomach, and do not take anything else by mouth or lie down for the next 30 min.    amLODIPine (NORVASC) 5 mg, oral, Daily, for blood pressure    aspirin 81 mg, Daily    atorvastatin (Lipitor) 40 mg tablet 1 tab po daily    calcium carbonate-vitamin D3 600 mg-5 mcg (200 unit) tablet 1 tablet, Daily    cholecalciferol (Vitamin D-3) 50 MCG (2000 UT) tablet 2 tablets, Daily    hydroCHLOROthiazide (HYDRODIURIL) 25 mg, oral, Daily    metoprolol tartrate (LOPRESSOR) 25 mg, oral, 2 times daily    MULTIVITAMIN ORAL Take by mouth.       Physical Exam:  Vitals:    06/04/25 1436   BP: 117/78   BP Location: Left arm   Patient Position: Sitting   BP Cuff Size: Adult   Pulse: 58   Resp: 16   SpO2: 96%   Weight: 48 kg (105 lb 12.8 oz)   Height: 1.448 m (4' 9\")     Wt Readings from Last 5 Encounters:   06/04/25 48 kg (105 lb 12.8 oz)   03/18/25 48.1 kg (106 lb)   11/12/24 48.1 kg (106 lb)   09/03/24 47.6 kg (105 lb)   08/05/24 47.2 kg (104 lb)     Body mass index is 22.89 kg/m².   General: Well-developed well-nourished in no acute distress  HEENT: Normocephalic atraumatic  Neck: Supple, JVP is normal negative hepatojugular reflux 2+ carotid pulses without bruit  Pulmonary: Normal respiratory effort, clear to auscultation  Cardiovascular: No right ventricular " "heave, normal S1 and S2, no murmurs rubs or gallops  Abdomen: Soft nontender nondistended  Extremities: Warm without edema 2+ radial pulses bilaterally 2+ posterior tibial pulses bilaterally  Neurologic: Alert and oriented x3  Psychiatric: Normal mood and affect     Last Labs:  CMP:  Recent Labs     05/29/25  1136 11/19/24  1020 05/28/24  0945    140 139   K 3.8 3.6 3.8    101 102   CO2 28 30 29   ANIONGAP 10 13 12   BUN 17 17 14   CREATININE 0.56* 0.63 0.57   EGFR 98 >90 >90   GLUCOSE 92 138* 96     Recent Labs     05/29/25  1136 11/19/24  1020 05/28/24  0945   ALBUMIN 4.8 4.4 4.4   ALKPHOS 54 53 63   ALT 33* 24 29   AST 27 23 22   BILITOT 0.6 0.5 0.4     CBC:  Recent Labs     11/19/24  1020 05/28/24  0945 12/22/22  0612   WBC 5.4 5.6 6.1   HGB 12.4 13.1 12.6   HCT 38.1 40.9 39.1    315 363   MCV 92 95 92     COAG: No results for input(s): \"INR\", \"DDIMERVTE\" in the last 43038 hours.  HEME/ENDO:  Recent Labs     05/29/25  1136 11/19/24  1020 11/07/23  0949 12/20/22  2231 08/25/20  1028 09/30/19  1009   FERRITIN  --   --   --   --   --  138   IRONSAT  --   --   --   --   --  22*   TSH 3.36 2.83 2.99  --    < > 2.84   HGBA1C 6.1*  --   --  5.5  --   --     < > = values in this interval not displayed.      CARDIAC:   Recent Labs     12/20/22 2231 12/20/22  1512 12/14/22  1525   TROPHS 29* 289* 29*     Recent Labs     05/29/25  1136 05/28/24  0945 05/10/23  1101 03/15/23  1159 01/26/23  1310   CHOL 152 152 151 152 127   LDLF  --   --  55 60 45   LDLCALC 56 58  --   --   --    HDL 81 78.3 81.4 74.4 71.5   TRIG 66 79 73 86 52       Last Cardiology Tests:  ECG:  Electrocardiogram performed today that I reviewed shows sinus bradycardia voltage criteria for LVH.    Echo:  Echocardiogram December 21, 2022  CONCLUSIONS:   1. Left ventricular systolic function is normal with a 65-70% estimated ejection fraction.   2. Spectral Doppler shows an impaired relaxation pattern of left ventricular diastolic " filling.       Cath:      Stress Test:  Stress Results:  No results found for this or any previous visit from the past 365 days.         Cardiac Imaging:  CT coronary angiogram with heart flow December 22, 2022  IMPRESSION:  1. Left dominant coronary artery system.  2. Mild atherosclerosis in the proximal LAD resulting in less than  25% luminal stenosis. The remainder of the coronary artery system  appears free of significant atherosclerosis    CT cardiac scoring April 4, 2017  FINDINGS:  The score and distribution of calcium in the coronary arteries is as  follows:     LM: 0.  LAD: 6.4.  LCx: 0.  RCA: 0.     Total: 6.4.     The visualized segments of the lungs show mild bibasilar infiltrates  and/or atelectasis.     The visualized mid/lower ascending thoracic aorta measures 3.4 cm in  diameter. The heart is borderline enlarged. Trace pericardial  effusion.    Assessment/Plan   Diagnoses and all orders for this visit:  Coronary arteriosclerosis  Primary hypertension  -     ECG 12 lead (Clinic Performed)  Elevated LDL cholesterol level    In summary Ms. Smith is a pleasant 69 year-old white female with a past medical history significant for coronary atherosclerosis with a CT calcium score of 6.4 in 2017 with nonobstructive mild CAD on CT angiography with preserved LV function, hypertension, hyperlipidemia with intolerance to 80 mg of atorvastatin, and apparent episode of MINOCA in 2022, and remote breast cancer with a history of Adriamycin use and radiation.  She is asymptomatic from a cardiac perspective.  Her blood pressure and lipids are at goal.  She should continue her current cardiovascular medications.  We will see her back in follow-up in 1 year.    Orders:  Orders Placed This Encounter   Procedures    ECG 12 lead (Clinic Performed)      Followup Appts:  Future Appointments   Date Time Provider Department Center   6/16/2025  9:30 AM Nai Elder MD [id number] [facility]   11/13/2025 10:30 AM Tram GALINDO  MD [place] [id number] [place]           ____________________________________________________________  MD [facility]

## 2025-06-05 LAB
ATRIAL RATE: 58 BPM
P AXIS: 73 DEGREES
P OFFSET: 190 MS
P ONSET: 138 MS
PR INTERVAL: 170 MS
Q ONSET: 223 MS
QRS COUNT: 9 BEATS
QRS DURATION: 82 MS
QT INTERVAL: 418 MS
QTC CALCULATION(BAZETT): 410 MS
QTC FREDERICIA: 413 MS
R AXIS: 57 DEGREES
T AXIS: 54 DEGREES
T OFFSET: 432 MS
VENTRICULAR RATE: 58 BPM

## 2025-06-16 ENCOUNTER — APPOINTMENT (OUTPATIENT)
Dept: OBSTETRICS AND GYNECOLOGY | Facility: CLINIC | Age: 70
End: 2025-06-16
Payer: MEDICARE

## 2025-06-16 VITALS
DIASTOLIC BLOOD PRESSURE: 73 MMHG | WEIGHT: 107 LBS | HEIGHT: 57 IN | SYSTOLIC BLOOD PRESSURE: 123 MMHG | BODY MASS INDEX: 23.08 KG/M2 | HEART RATE: 56 BPM

## 2025-06-16 DIAGNOSIS — Z01.419 ENCOUNTER FOR GYNECOLOGICAL EXAMINATION WITHOUT ABNORMAL FINDING: Primary | ICD-10-CM

## 2025-06-16 DIAGNOSIS — Z12.31 ENCOUNTER FOR SCREENING MAMMOGRAM FOR MALIGNANT NEOPLASM OF BREAST: ICD-10-CM

## 2025-06-16 DIAGNOSIS — M81.0 AGE-RELATED OSTEOPOROSIS WITHOUT CURRENT PATHOLOGICAL FRACTURE: ICD-10-CM

## 2025-06-16 DIAGNOSIS — R92.30 DENSE BREAST TISSUE: ICD-10-CM

## 2025-06-16 DIAGNOSIS — Z85.3 HISTORY OF LEFT BREAST CANCER: ICD-10-CM

## 2025-06-16 PROCEDURE — 1159F MED LIST DOCD IN RCRD: CPT | Performed by: OBSTETRICS & GYNECOLOGY

## 2025-06-16 PROCEDURE — 3078F DIAST BP <80 MM HG: CPT | Performed by: OBSTETRICS & GYNECOLOGY

## 2025-06-16 PROCEDURE — 3008F BODY MASS INDEX DOCD: CPT | Performed by: OBSTETRICS & GYNECOLOGY

## 2025-06-16 PROCEDURE — 3074F SYST BP LT 130 MM HG: CPT | Performed by: OBSTETRICS & GYNECOLOGY

## 2025-06-16 PROCEDURE — 1036F TOBACCO NON-USER: CPT | Performed by: OBSTETRICS & GYNECOLOGY

## 2025-06-16 PROCEDURE — G0101 CA SCREEN;PELVIC/BREAST EXAM: HCPCS | Performed by: OBSTETRICS & GYNECOLOGY

## 2025-06-16 ASSESSMENT — PATIENT HEALTH QUESTIONNAIRE - PHQ9
SUM OF ALL RESPONSES TO PHQ9 QUESTIONS 1 AND 2: 0
1. LITTLE INTEREST OR PLEASURE IN DOING THINGS: NOT AT ALL
2. FEELING DOWN, DEPRESSED OR HOPELESS: NOT AT ALL

## 2025-06-16 ASSESSMENT — ENCOUNTER SYMPTOMS
DEPRESSION: 0
LOSS OF SENSATION IN FEET: 0
OCCASIONAL FEELINGS OF UNSTEADINESS: 0

## 2025-06-16 NOTE — PROGRESS NOTES
Subjective   Anna Smith is a 69 y.o. female here for GYN care.  She has a history of left breast cancer.  She prefers a yearly clinical breast exam.  There is no postmenopausal bleeding or discharge.  No dysuria or change in bowel habits.  She does have her colonoscopy planned in .    A bone density with her PCP in 2024 showed osteoporosis, she is on Fosamax which was started 2025 by her PCP.    She is current on her breast imaging which is due in 2025.  She voices no specific breast concerns.  She did get diagnosed by clinical exam, as mammogram did not notice the mass on the left.  Personal health questionnaire reviewed: yes.     Gynecologic History  Patient's last menstrual period was 2020.  Contraception: post menopausal status  Last Pap: 5/10/22. Results were: normal.  She is HPV negative in .  Last mammogram: 9/3/24. Results were: normal    Obstetric History  OB History    Para Term  AB Living   0 0 0 0 0 0   SAB IAB Ectopic Multiple Live Births   0 0 0 0 0       Objective   Constitutional: Alert and in no acute distress. Well developed, well nourished.   Head and Face: Head and face: Normal.    Eyes: Normal external exam - nonicteric sclera, extraocular movements intact (EOMI) and no ptosis.   Neck: No neck asymmetry. Supple. Thyroid not enlarged and there were no palpable thyroid nodules.    Pulmonary: No respiratory distress.   Chest: Breasts: Normal appearance, no nipple discharge and no skin changes. Palpation of breasts and axillae: No palpable mass and no axillary lymphadenopathy.   Abdomen: Soft nontender; no abdominal mass palpated. No organomegaly. No hernias.   Genitourinary: External genitalia: Normal. No inguinal lymphadenopathy. Bartholin's Urethral and Skenes Glands: Normal. Urethra: Normal.  Bladder: Normal on palpation. Vagina: Normal. Cervix: Normal.  Uterus: Normal.  Right Adnexa/parametria: Normal.  Left Adnexa/parametria: Normal.   Inspection of Perianal Area: Normal.   Musculoskeletal: No joint swelling seen, normal movements of all extremities.   Skin: Normal skin color and pigmentation, normal skin turgor, and no rash.   Neurologic: Non-focal. Grossly intact.   Psychiatric: Alert and oriented x 3. Affect normal to patient baseline. Mood: Appropriate.  Physical Exam     Assessment/Plan   This is a 69-year-old female with a normal exam.  No Pap smear was sent, she had a normal Pap in 2022 and is HPV negative in 2018.  No further Pap smears should be necessary.    Her routine mammogram was ordered with tomosynthesis.  Her breast exam was normal, she has a history of left breast cancer.    She is current on her bone density, she has osteoporosis.    I will see her in 1 year.    Education reviewed: self breast exams.  Mammogram ordered.

## 2025-08-27 DIAGNOSIS — I10 HYPERTENSION, UNSPECIFIED TYPE: ICD-10-CM

## 2025-08-27 DIAGNOSIS — Z13.6 SCREENING FOR CARDIOVASCULAR CONDITION: ICD-10-CM

## 2025-08-27 DIAGNOSIS — E78.00 ELEVATED LDL CHOLESTEROL LEVEL: ICD-10-CM

## 2025-08-27 DIAGNOSIS — Z79.899 LONG TERM USE OF DRUG: ICD-10-CM

## 2025-08-27 DIAGNOSIS — Z13.228 SCREENING FOR ENDOCRINE, METABOLIC AND IMMUNITY DISORDER: ICD-10-CM

## 2025-08-27 DIAGNOSIS — Z13.228 SCREENING FOR ENDOCRINE/METABOLIC/IMMUNITY DISORDERS: ICD-10-CM

## 2025-08-27 DIAGNOSIS — Z13.89 SCREENING FOR BLOOD OR PROTEIN IN URINE: ICD-10-CM

## 2025-08-27 DIAGNOSIS — I10 ESSENTIAL (PRIMARY) HYPERTENSION: ICD-10-CM

## 2025-08-27 DIAGNOSIS — E55.9 VITAMIN D DEFICIENCY: ICD-10-CM

## 2025-08-27 DIAGNOSIS — Z13.0 SCREENING FOR DEFICIENCY ANEMIA: Primary | ICD-10-CM

## 2025-08-27 DIAGNOSIS — Z13.29 SCREENING FOR ENDOCRINE, METABOLIC AND IMMUNITY DISORDER: ICD-10-CM

## 2025-08-27 DIAGNOSIS — Z13.0 SCREENING FOR ENDOCRINE, METABOLIC AND IMMUNITY DISORDER: ICD-10-CM

## 2025-08-27 DIAGNOSIS — R73.03 PREDIABETES: ICD-10-CM

## 2025-08-27 DIAGNOSIS — R79.9 ABNORMAL BLOOD CELL COUNT: ICD-10-CM

## 2025-08-27 DIAGNOSIS — M81.0 OSTEOPOROSIS, UNSPECIFIED OSTEOPOROSIS TYPE, UNSPECIFIED PATHOLOGICAL FRACTURE PRESENCE: ICD-10-CM

## 2025-08-27 DIAGNOSIS — Z13.29 SCREENING FOR ENDOCRINE/METABOLIC/IMMUNITY DISORDERS: ICD-10-CM

## 2025-08-27 DIAGNOSIS — Z13.0 SCREENING FOR ENDOCRINE/METABOLIC/IMMUNITY DISORDERS: ICD-10-CM

## 2025-08-27 RX ORDER — ATORVASTATIN CALCIUM 40 MG/1
40 TABLET, FILM COATED ORAL DAILY
Qty: 90 TABLET | Refills: 0 | Status: SHIPPED | OUTPATIENT
Start: 2025-08-27

## 2025-08-27 RX ORDER — METOPROLOL TARTRATE 25 MG/1
25 TABLET, FILM COATED ORAL 2 TIMES DAILY
Qty: 180 TABLET | Refills: 0 | Status: SHIPPED | OUTPATIENT
Start: 2025-08-27

## 2025-09-02 DIAGNOSIS — Z12.11 SPECIAL SCREENING FOR MALIGNANT NEOPLASMS, COLON: ICD-10-CM

## 2025-09-02 RX ORDER — POLYETHYLENE GLYCOL 3350, SODIUM SULFATE ANHYDROUS, SODIUM BICARBONATE, SODIUM CHLORIDE, POTASSIUM CHLORIDE 236; 22.74; 6.74; 5.86; 2.97 G/4L; G/4L; G/4L; G/4L; G/4L
POWDER, FOR SOLUTION ORAL
Qty: 4000 ML | Refills: 0 | Status: SHIPPED | OUTPATIENT
Start: 2025-09-02

## 2025-09-08 ENCOUNTER — APPOINTMENT (OUTPATIENT)
Dept: RADIOLOGY | Facility: CLINIC | Age: 70
End: 2025-09-08
Payer: MEDICARE

## 2025-10-07 ENCOUNTER — APPOINTMENT (OUTPATIENT)
Dept: GASTROENTEROLOGY | Facility: EXTERNAL LOCATION | Age: 70
End: 2025-10-07
Payer: MEDICARE

## 2025-11-04 ENCOUNTER — APPOINTMENT (OUTPATIENT)
Dept: GASTROENTEROLOGY | Facility: EXTERNAL LOCATION | Age: 70
End: 2025-11-04
Payer: MEDICARE

## 2025-11-13 ENCOUNTER — APPOINTMENT (OUTPATIENT)
Dept: PRIMARY CARE | Facility: CLINIC | Age: 70
End: 2025-11-13
Payer: MEDICARE

## 2026-06-22 ENCOUNTER — APPOINTMENT (OUTPATIENT)
Facility: CLINIC | Age: 71
End: 2026-06-22
Payer: MEDICARE